# Patient Record
Sex: MALE | Race: BLACK OR AFRICAN AMERICAN | NOT HISPANIC OR LATINO | Employment: UNEMPLOYED | URBAN - METROPOLITAN AREA
[De-identification: names, ages, dates, MRNs, and addresses within clinical notes are randomized per-mention and may not be internally consistent; named-entity substitution may affect disease eponyms.]

---

## 2018-09-27 ENCOUNTER — OFFICE VISIT - HEALTHEAST (OUTPATIENT)
Dept: FAMILY MEDICINE | Facility: CLINIC | Age: 48
End: 2018-09-27

## 2018-09-27 ENCOUNTER — COMMUNICATION - HEALTHEAST (OUTPATIENT)
Dept: FAMILY MEDICINE | Facility: CLINIC | Age: 48
End: 2018-09-27

## 2018-09-27 DIAGNOSIS — Z23 IMMUNIZATION DUE: ICD-10-CM

## 2018-09-27 DIAGNOSIS — Z00.00 ROUTINE GENERAL MEDICAL EXAMINATION AT A HEALTH CARE FACILITY: ICD-10-CM

## 2018-09-27 DIAGNOSIS — M79.605 PAIN IN BOTH LOWER EXTREMITIES: ICD-10-CM

## 2018-09-27 DIAGNOSIS — M79.604 PAIN IN BOTH LOWER EXTREMITIES: ICD-10-CM

## 2018-09-27 DIAGNOSIS — R35.89 POLYURIA: ICD-10-CM

## 2018-09-27 LAB
ANION GAP SERPL CALCULATED.3IONS-SCNC: 10 MMOL/L (ref 5–18)
BUN SERPL-MCNC: 9 MG/DL (ref 8–22)
CALCIUM SERPL-MCNC: 9.7 MG/DL (ref 8.5–10.5)
CHLORIDE BLD-SCNC: 102 MMOL/L (ref 98–107)
CO2 SERPL-SCNC: 27 MMOL/L (ref 22–31)
CREAT SERPL-MCNC: 0.86 MG/DL (ref 0.7–1.3)
GFR SERPL CREATININE-BSD FRML MDRD: >60 ML/MIN/1.73M2
GLUCOSE BLD-MCNC: 219 MG/DL (ref 70–125)
LDLC SERPL CALC-MCNC: 100 MG/DL
POTASSIUM BLD-SCNC: 4.6 MMOL/L (ref 3.5–5)
SODIUM SERPL-SCNC: 139 MMOL/L (ref 136–145)

## 2018-09-27 ASSESSMENT — MIFFLIN-ST. JEOR: SCORE: 1727.93

## 2020-11-10 ENCOUNTER — COMMUNICATION - HEALTHEAST (OUTPATIENT)
Dept: TELEHEALTH | Facility: CLINIC | Age: 50
End: 2020-11-10

## 2020-11-10 ENCOUNTER — OFFICE VISIT - HEALTHEAST (OUTPATIENT)
Dept: FAMILY MEDICINE | Facility: CLINIC | Age: 50
End: 2020-11-10

## 2020-11-10 ENCOUNTER — HOSPITAL ENCOUNTER (OUTPATIENT)
Dept: ULTRASOUND IMAGING | Facility: CLINIC | Age: 50
Discharge: HOME | End: 2020-11-10
Attending: FAMILY MEDICINE

## 2020-11-10 DIAGNOSIS — M79.89 CALF SWELLING: ICD-10-CM

## 2020-11-10 DIAGNOSIS — Z13.220 LIPID SCREENING: ICD-10-CM

## 2020-11-10 DIAGNOSIS — Z11.59 ENCOUNTER FOR HCV SCREENING TEST FOR LOW RISK PATIENT: ICD-10-CM

## 2020-11-10 DIAGNOSIS — Z00.00 HEALTHCARE MAINTENANCE: ICD-10-CM

## 2020-11-10 DIAGNOSIS — I82.401 ACUTE DEEP VEIN THROMBOSIS (DVT) OF RIGHT LOWER EXTREMITY, UNSPECIFIED VEIN (H): ICD-10-CM

## 2020-11-10 DIAGNOSIS — Z11.4 SCREENING FOR HUMAN IMMUNODEFICIENCY VIRUS WITHOUT PRESENCE OF RISK FACTORS: ICD-10-CM

## 2020-11-10 LAB
ALBUMIN SERPL-MCNC: 4.2 G/DL (ref 3.5–5)
ALP SERPL-CCNC: 82 U/L (ref 45–120)
ALT SERPL W P-5'-P-CCNC: 23 U/L (ref 0–45)
ANION GAP SERPL CALCULATED.3IONS-SCNC: 9 MMOL/L (ref 5–18)
AST SERPL W P-5'-P-CCNC: 13 U/L (ref 0–40)
BASOPHILS # BLD AUTO: 0 THOU/UL (ref 0–0.2)
BASOPHILS NFR BLD AUTO: 1 % (ref 0–2)
BILIRUB SERPL-MCNC: 0.6 MG/DL (ref 0–1)
BUN SERPL-MCNC: 14 MG/DL (ref 8–22)
CALCIUM SERPL-MCNC: 9.7 MG/DL (ref 8.5–10.5)
CHLORIDE BLD-SCNC: 100 MMOL/L (ref 98–107)
CHOLEST SERPL-MCNC: 187 MG/DL
CO2 SERPL-SCNC: 28 MMOL/L (ref 22–31)
CREAT SERPL-MCNC: 0.9 MG/DL (ref 0.7–1.3)
EOSINOPHIL # BLD AUTO: 0.3 THOU/UL (ref 0–0.4)
EOSINOPHIL NFR BLD AUTO: 4 % (ref 0–6)
ERYTHROCYTE [DISTWIDTH] IN BLOOD BY AUTOMATED COUNT: 10.7 % (ref 11–14.5)
FASTING STATUS PATIENT QL REPORTED: NO
GFR SERPL CREATININE-BSD FRML MDRD: >60 ML/MIN/1.73M2
GLUCOSE BLD-MCNC: 306 MG/DL (ref 70–125)
HCT VFR BLD AUTO: 45.6 % (ref 40–54)
HDLC SERPL-MCNC: 43 MG/DL
HGB BLD-MCNC: 15.5 G/DL (ref 14–18)
HIV 1+2 AB+HIV1 P24 AG SERPL QL IA: NEGATIVE
INR PPP: 1.02 (ref 0.9–1.1)
LDLC SERPL CALC-MCNC: 109 MG/DL
LYMPHOCYTES # BLD AUTO: 1.8 THOU/UL (ref 0.8–4.4)
LYMPHOCYTES NFR BLD AUTO: 23 % (ref 20–40)
MCH RBC QN AUTO: 31.1 PG (ref 27–34)
MCHC RBC AUTO-ENTMCNC: 33.9 G/DL (ref 32–36)
MCV RBC AUTO: 92 FL (ref 80–100)
MONOCYTES # BLD AUTO: 0.6 THOU/UL (ref 0–0.9)
MONOCYTES NFR BLD AUTO: 7 % (ref 2–10)
NEUTROPHILS # BLD AUTO: 5.1 THOU/UL (ref 2–7.7)
NEUTROPHILS NFR BLD AUTO: 66 % (ref 50–70)
PLATELET # BLD AUTO: 176 THOU/UL (ref 140–440)
PMV BLD AUTO: 8.7 FL (ref 7–10)
POTASSIUM BLD-SCNC: 4 MMOL/L (ref 3.5–5)
PROT SERPL-MCNC: 7.4 G/DL (ref 6–8)
RBC # BLD AUTO: 4.98 MILL/UL (ref 4.4–6.2)
SODIUM SERPL-SCNC: 137 MMOL/L (ref 136–145)
TRIGL SERPL-MCNC: 176 MG/DL
WBC: 7.8 THOU/UL (ref 4–11)

## 2020-11-10 ASSESSMENT — MIFFLIN-ST. JEOR: SCORE: 1682.72

## 2020-11-11 ENCOUNTER — COMMUNICATION - HEALTHEAST (OUTPATIENT)
Dept: FAMILY MEDICINE | Facility: CLINIC | Age: 50
End: 2020-11-11

## 2020-11-11 DIAGNOSIS — I82.401 ACUTE DEEP VEIN THROMBOSIS (DVT) OF RIGHT LOWER EXTREMITY, UNSPECIFIED VEIN (H): ICD-10-CM

## 2020-11-11 LAB
25(OH)D3 SERPL-MCNC: 19.4 NG/ML (ref 30–80)
25(OH)D3 SERPL-MCNC: 19.4 NG/ML (ref 30–80)
HCV AB SERPL QL IA: NEGATIVE

## 2020-11-16 ENCOUNTER — OFFICE VISIT - HEALTHEAST (OUTPATIENT)
Dept: FAMILY MEDICINE | Facility: CLINIC | Age: 50
End: 2020-11-16

## 2020-11-16 DIAGNOSIS — R73.9 HYPERGLYCEMIA: ICD-10-CM

## 2020-11-16 DIAGNOSIS — I82.401 ACUTE DEEP VEIN THROMBOSIS (DVT) OF RIGHT LOWER EXTREMITY, UNSPECIFIED VEIN (H): ICD-10-CM

## 2020-11-16 LAB — HBA1C MFR BLD: 8.4 %

## 2020-11-16 RX ORDER — RIVAROXABAN 15 MG/1
1 TABLET, FILM COATED ORAL 2 TIMES DAILY
Status: SHIPPED | COMMUNITY
Start: 2020-11-11 | End: 2024-09-09

## 2020-11-17 ENCOUNTER — COMMUNICATION - HEALTHEAST (OUTPATIENT)
Dept: FAMILY MEDICINE | Facility: CLINIC | Age: 50
End: 2020-11-17

## 2020-11-18 ENCOUNTER — AMBULATORY - HEALTHEAST (OUTPATIENT)
Dept: FAMILY MEDICINE | Facility: CLINIC | Age: 50
End: 2020-11-18

## 2020-11-18 DIAGNOSIS — E11.65 TYPE 2 DIABETES MELLITUS WITH HYPERGLYCEMIA, WITHOUT LONG-TERM CURRENT USE OF INSULIN (H): ICD-10-CM

## 2020-11-18 RX ORDER — METFORMIN HCL 500 MG
500 TABLET, EXTENDED RELEASE 24 HR ORAL
Qty: 30 TABLET | Refills: 3 | Status: SHIPPED | OUTPATIENT
Start: 2020-11-18 | End: 2024-09-05

## 2020-12-02 ENCOUNTER — COMMUNICATION - HEALTHEAST (OUTPATIENT)
Dept: FAMILY MEDICINE | Facility: CLINIC | Age: 50
End: 2020-12-02

## 2020-12-02 DIAGNOSIS — E11.65 TYPE 2 DIABETES MELLITUS WITH HYPERGLYCEMIA, WITHOUT LONG-TERM CURRENT USE OF INSULIN (H): ICD-10-CM

## 2020-12-02 RX ORDER — GLUCOSAMINE HCL/CHONDROITIN SU 500-400 MG
CAPSULE ORAL
Qty: 100 STRIP | Refills: 3 | Status: SHIPPED | OUTPATIENT
Start: 2020-12-02 | End: 2024-09-15

## 2020-12-04 ENCOUNTER — COMMUNICATION - HEALTHEAST (OUTPATIENT)
Dept: ADMINISTRATIVE | Facility: CLINIC | Age: 50
End: 2020-12-04

## 2021-01-06 ENCOUNTER — COMMUNICATION - HEALTHEAST (OUTPATIENT)
Dept: TELEHEALTH | Facility: CLINIC | Age: 51
End: 2021-01-06

## 2021-01-06 ENCOUNTER — OFFICE VISIT - HEALTHEAST (OUTPATIENT)
Dept: FAMILY MEDICINE | Facility: CLINIC | Age: 51
End: 2021-01-06

## 2021-01-06 DIAGNOSIS — I82.401 ACUTE DEEP VEIN THROMBOSIS (DVT) OF RIGHT LOWER EXTREMITY, UNSPECIFIED VEIN (H): ICD-10-CM

## 2021-01-06 DIAGNOSIS — E11.65 TYPE 2 DIABETES MELLITUS WITH HYPERGLYCEMIA, WITHOUT LONG-TERM CURRENT USE OF INSULIN (H): ICD-10-CM

## 2021-01-06 LAB
FASTING STATUS PATIENT QL REPORTED: NO
GLUCOSE BLD-MCNC: 184 MG/DL (ref 74–125)
HBA1C MFR BLD: 8.3 %

## 2021-01-07 ENCOUNTER — COMMUNICATION - HEALTHEAST (OUTPATIENT)
Dept: FAMILY MEDICINE | Facility: CLINIC | Age: 51
End: 2021-01-07

## 2021-02-22 ENCOUNTER — COMMUNICATION - HEALTHEAST (OUTPATIENT)
Dept: FAMILY MEDICINE | Facility: CLINIC | Age: 51
End: 2021-02-22

## 2021-02-22 DIAGNOSIS — I82.401 ACUTE DEEP VEIN THROMBOSIS (DVT) OF RIGHT LOWER EXTREMITY, UNSPECIFIED VEIN (H): ICD-10-CM

## 2021-02-22 RX ORDER — RIVAROXABAN 20 MG/1
20 TABLET, FILM COATED ORAL ONCE
Qty: 30 TABLET | Refills: 5 | Status: SHIPPED | OUTPATIENT
Start: 2021-02-22 | End: 2021-02-22

## 2021-05-01 ENCOUNTER — HEALTH MAINTENANCE LETTER (OUTPATIENT)
Age: 51
End: 2021-05-01

## 2021-05-21 ENCOUNTER — COMMUNICATION - HEALTHEAST (OUTPATIENT)
Dept: FAMILY MEDICINE | Facility: CLINIC | Age: 51
End: 2021-05-21

## 2021-05-21 DIAGNOSIS — E11.65 TYPE 2 DIABETES MELLITUS WITH HYPERGLYCEMIA, WITHOUT LONG-TERM CURRENT USE OF INSULIN (H): ICD-10-CM

## 2021-06-02 VITALS — WEIGHT: 189 LBS | HEIGHT: 70 IN | BODY MASS INDEX: 27.06 KG/M2

## 2021-06-05 VITALS
TEMPERATURE: 97 F | DIASTOLIC BLOOD PRESSURE: 92 MMHG | HEART RATE: 84 BPM | WEIGHT: 178 LBS | BODY MASS INDEX: 25.54 KG/M2 | RESPIRATION RATE: 20 BRPM | SYSTOLIC BLOOD PRESSURE: 145 MMHG

## 2021-06-05 VITALS
TEMPERATURE: 98.1 F | DIASTOLIC BLOOD PRESSURE: 82 MMHG | SYSTOLIC BLOOD PRESSURE: 152 MMHG | WEIGHT: 180 LBS | HEIGHT: 70 IN | RESPIRATION RATE: 20 BRPM | BODY MASS INDEX: 25.77 KG/M2 | HEART RATE: 88 BPM

## 2021-06-05 VITALS
DIASTOLIC BLOOD PRESSURE: 84 MMHG | SYSTOLIC BLOOD PRESSURE: 137 MMHG | RESPIRATION RATE: 20 BRPM | BODY MASS INDEX: 26.26 KG/M2 | TEMPERATURE: 97.6 F | HEART RATE: 85 BPM | WEIGHT: 183 LBS

## 2021-06-13 NOTE — PROGRESS NOTES
"ASSESSMENT/PLAN:  1. Acute deep vein thrombosis (DVT) of right lower extremity, unspecified vein (H)  rivaroxaban ANTICOAGULANT (XARELTO) 20 mg tablet   2. Hyperglycemia  Glycosylated Hemoglobin A1c       This is a 51 yo male with:  1.  Acute DVT right lower extremity - currently on Rivaroxaban.  Tolerating well.  Reviewed the dosing schedule.    2.  Hyperglycemia - has elevated A1c - fits criteria for type II diabetes.  Patient is quite certain that his numbers were high in the past, now claims they are better and he is certain that he does not have diabetes.  Again, we reviewed the A1c and its implications.  I really think he needs medications - he declines.  I have discussed the consequences of ignoring this.  I would like to see him back on short term followup.               No follow-ups on file.      Medications Discontinued During This Encounter   Medication Reason     apixaban ANTICOAGULANT (ELIQUIS) 5 mg Tab tablet      There are no Patient Instructions on file for this visit.    Chief Complaint:  Chief Complaint   Patient presents with     Follow-up     right leg blood clot       HPI:   Domenico Harper is a 50 y.o. male c/o  Leg still occasionally painful  No chest pain, no shortness of breath    Long time ago - 1990 - had small surgery - had \"branch\" in his skin - on right lower lateral leg      PMH:   Patient Active Problem List    Diagnosis Date Noted     Type 2 diabetes mellitus with hyperglycemia, without long-term current use of insulin (H) 11/18/2020     No past medical history on file.  No past surgical history on file.  Social History     Socioeconomic History     Marital status:      Spouse name: Not on file     Number of children: Not on file     Years of education: Not on file     Highest education level: Not on file   Occupational History     Not on file   Social Needs     Financial resource strain: Not on file     Food insecurity     Worry: Not on file     Inability: Not on file     " Transportation needs     Medical: Not on file     Non-medical: Not on file   Tobacco Use     Smoking status: Never Smoker     Smokeless tobacco: Never Used     Tobacco comment: no tobacco exposure   Substance and Sexual Activity     Alcohol use: Not on file     Drug use: Not on file     Sexual activity: Yes     Partners: Female     Birth control/protection: Condom   Lifestyle     Physical activity     Days per week: Not on file     Minutes per session: Not on file     Stress: Not on file   Relationships     Social connections     Talks on phone: Not on file     Gets together: Not on file     Attends [employer] service: Not on file     Active member of club or organization: Not on file     Attends meetings of clubs or organizations: Not on file     Relationship status: Not on file     Intimate partner violence     Fear of current or ex partner: Not on file     Emotionally abused: Not on file     Physically abused: Not on file     Forced sexual activity: Not on file   Other Topics Concern     Not on file   Social History Narrative     Not on file     No family history on file.    Meds:    Current Outpatient Medications:      metFORMIN (GLUCOPHAGE XR) 500 MG 24 hr tablet, Take 1 tablet (500 mg total) by mouth daily with breakfast., Disp: 30 tablet, Rfl: 3     [START ON 12/9/2020] rivaroxaban ANTICOAGULANT (XARELTO) 20 mg tablet, Take 1 tablet (20 mg total) by mouth daily., Disp: 90 tablet, Rfl: 1     XARELTO 15 mg tablet, Take 1 tablet by mouth 2 (two) times a day. X 21 days (starting 11/11/2020), Disp: , Rfl:      XARELTO 20 mg tablet, Take 1 tablet by mouth daily. Starting 12/2/2020, Disp: , Rfl:     Allergies:  No Known Allergies    ROS:  Pertinent positives as noted in HPI; otherwise 12 point ROS negative.      Physical Exam:  EXAM:  BP (!) 145/92   Pulse 84   Temp 97  F (36.1  C) (Tympanic)   Resp 20   Wt 178 lb (80.7 kg)   BMI 25.54 kg/m     Gen:  NAD, appears well, well-hydrated  HEENT:  TMs nl, oropharynx  benign, nasal mucosa nl, conjunctiva clear  Neck:  Supple, no adenopathy, no thyromegaly, no carotid bruits, no JVD  Lungs:  Clear to auscultation bilaterally  Cor:  RRR no murmur  Abd:  Soft, nontender, BS+, no masses, no guarding or rebound, no HSM  Extr:  Less swelling in right lower extremity, no tenderness in claf  Neuro:  No asymmetry  Skin:  Warm/dry        Results:  Results for orders placed or performed in visit on 11/16/20   Glycosylated Hemoglobin A1c   Result Value Ref Range    Hemoglobin A1c 8.4 (H) <=5.6 %

## 2021-06-13 NOTE — TELEPHONE ENCOUNTER
----- Message from Miri Kohler, Diabetes Ed sent at 12/3/2020  4:13 PM CST -----  Regarding: appointment  Would you please reach out to patient to schedule an appointment .    Thank you,    Miri GOLDMAN

## 2021-06-13 NOTE — TELEPHONE ENCOUNTER
Drug Change Request  Who is calling?:  Patient  What is the current medication?:    Disp Refills Start End MARIO    rivaroxaban ANTICOAGULANT (XARELTO) 15 mg (42)- 20 mg (9) dosepak 51 tablet 0 11/10/2020  --   Sig: Take 1 tablet (15mg) po bid for 21 days then take 1 tablet (20mg) daily   Sent to pharmacy as: rivaroxaban 15 mg (42)-20 mg (9) tablets in a starter pack (XARELTO)   E-Prescribing Status: Receipt confirmed by pharmacy (11/10/2020  6:49 PM CST)       What alternative is being requested?: n/a  Why the request to change?:  Patient stated he is not able to get the Rx above because it's not covered by his insurance and the pharmacy does not have it in stock. Patient would like a different Rx sent in that is covered and is available.  [facility]ed [facility]acy?: [facility]  Okay to leave a detailed message?:  No

## 2021-06-13 NOTE — TELEPHONE ENCOUNTER
Medication Request  Medication name: Glucometer and test strips  Requested Pharmacy: [facility]  Reason for request: Abnormal A1C (8.4)  When did you use medication last?:  Has not used yet  Patient offered appointment:  patient declined  Okay to leave a detailed message: yes

## 2021-06-13 NOTE — PROGRESS NOTES
ASSESSMENT/PLAN:  1. Calf swelling  US Venous Leg Right    INR   2. Lipid screening  Lipid [place] FASTING   3. Encounter for HCV screening test for low risk patient  Hepatitis C Antibody (Anti-HCV)   4. Screening for human immunodeficiency virus without presence of risk factors  HIV Antigen/Antibody Screening [place]   5. Healthcare maintenance  Comprehensive Metabolic Panel    HM1(CBC and Differential)    Vitamin D, Total (25-Hydroxy)   6. Acute deep vein thrombosis (DVT) of right lower extremity, unspecified vein (H)  DISCONTINUED: rivaroxaban ANTICOAGULANT (XARELTO) 15 mg (42)- 20 mg (9) dosepak       This is a 49 yo male  ([place] the road [place] [facility]) who presents with:  1.  Painful right calf swelling - no h/o thrombosis (no personal nor family history).  Has had persistent swelling in right lower extremity - mostly in proximal calf, but also distal posterior thigh.  Does not have knee pain.  Pulses intact.  I am very suspicious for DVT - will send for venous ultrasound ASAP.      2.  Acute DVT - right lower extremity - patient has acute DVT by ultrasound today - discussed with radiology, then with patient.  Discussed warfarin vs. DOAC.  Will start Xarelto.  Rec heck next week.  Discussed that he should not drive this week.   Phone call from [facility]:  Patient has acute blood clot - right lower extremity.    3.  Health Maintenance -   discussed recommendation for hepatitis C and HIV screening - ordered  Check screening labs    Return in about 1 week (around 11/17/2020) for Recheck DVT.      There are no discontinued medications.  There are no Patient Instructions on file for this visit.    Chief Complaint:  Chief Complaint   Patient presents with     Leg Swelling     right leg       HPI:   Domenico PERAZA Meredith is a 50 y.o. male c/o  No injury   [place]   Never had blood clot -   Swelling in right lower extremity - started last week  Painful especially  just below posterior knee        PMH:   There are no active problems to display for this patient.    History reviewed. No pertinent past medical history.  History reviewed. No pertinent surgical history.  Social History     Socioeconomic History     Marital status:      Spouse name: Not on file     Number of children: Not on file     Years of education: Not on file     Highest education level: Not on file   Occupational History     Not on file   Social Needs     Financial resource strain: Not on file     Food insecurity     Worry: Not on file     Inability: Not on file     Transportation needs     Medical: Not on file     Non-medical: Not on file   Tobacco Use     Smoking status: Never Smoker     Smokeless tobacco: Never Used     Tobacco comment: no tobacco exposure   Substance and Sexual Activity     Alcohol use: Not on file     Drug use: Not on file     Sexual activity: Yes     Partners: Female     Birth control/protection: Condom   Lifestyle     Physical activity     Days per week: Not on file     Minutes per session: Not on file     Stress: Not on file   Relationships     Social connections     Talks on phone: Not on file     Gets together: Not on file     Attends Jehovah's witness service: Not on file     Active member of club or organization: Not on file     Attends meetings of clubs or organizations: Not on file     Relationship status: Not on file     Intimate partner violence     Fear of current or ex partner: Not on file     Emotionally abused: Not on file     Physically abused: Not on file     Forced sexual activity: Not on file   Other Topics Concern     Not on file   Social History Narrative     Not on file     History reviewed. No pertinent family history.    Meds:    Current Outpatient Medications:      apixaban ANTICOAGULANT (ELIQUIS) 5 mg Tab tablet, Take 2 tablets (10 mg total) by mouth 2 (two) times a day for 7 days, THEN 1 tablet (5 mg total) 2 (two) times a day., Disp: 74 tablet, Rfl:  "1    Allergies:  No Known Allergies    ROS:  Pertinent positives as noted in HPI; otherwise 12 point ROS negative.      Physical Exam:  EXAM:  /82   Pulse 88   Temp 98.1  F (36.7  C) (Tympanic)   Resp 20   Ht 5' 10\" (1.778 m)   Wt 180 lb (81.6 kg)   BMI 25.83 kg/m     Gen:  NAD, appears well, well-hydrated  HEENT:  TMs nl, oropharynx benign, nasal mucosa nl, conjunctiva clear  Neck:  Supple, no adenopathy, no thyromegaly, no carotid bruits, no JVD  Lungs:  Clear to auscultation bilaterally  Cor:  RRR no murmur  Abd:  Soft, nontender, BS+, no masses, no guarding or rebound, no HSM  Extr:  Swelling and tenderness in proximal right calf - positive squeeze  Neuro:  No asymmetry  Skin:  Warm/dry        Results:  Results for orders placed or performed in visit on 11/10/20   Comprehensive Metabolic Panel   Result Value Ref Range    Sodium 137 136 - 145 mmol/L    Potassium 4.0 3.5 - 5.0 mmol/L    Chloride 100 98 - 107 mmol/L    CO2 28 22 - 31 mmol/L    Anion Gap, Calculation 9 5 - 18 mmol/L    Glucose 306 (H) 70 - 125 mg/dL    BUN 14 8 - 22 mg/dL    Creatinine 0.90 0.70 - 1.30 mg/dL    GFR MDRD Af Amer >60 >60 mL/min/1.73m2    GFR MDRD Non Af Amer >60 >60 mL/min/1.73m2    Bilirubin, Total 0.6 0.0 - 1.0 mg/dL    Calcium 9.7 8.5 - 10.5 mg/dL    Protein, Total 7.4 6.0 - 8.0 g/dL    Albumin 4.2 3.5 - 5.0 g/dL    Alkaline Phosphatase 82 45 - 120 U/L    AST 13 0 - 40 U/L    ALT 23 0 - 45 U/L   Lipid [place] FASTING   Result Value Ref Range    Cholesterol 187 <=199 mg/dL    Triglycerides 176 (H) <=149 mg/dL    HDL Cholesterol 43 >=40 mg/dL    LDL Calculated 109 <=129 mg/dL    Patient Fasting > 8hrs? No    Hepatitis C Antibody (Anti-HCV)   Result Value Ref Range    Hepatitis C Ab Negative Negative   HIV Antigen/Antibody Screening [place]   Result Value Ref Range    HIV Antigen / Antibody Negative Negative   Vitamin D, Total (25-Hydroxy)   Result Value Ref Range    Vitamin D, Total (25-Hydroxy) 19.4 (L) 30.0 - 80.0 ng/mL "   INR   Result Value Ref Range    INR 1.02 0.90 - 1.10   HM1 (CBC with Diff)   Result Value Ref Range    WBC 7.8 4.0 - 11.0 thou/uL    RBC 4.98 4.40 - 6.20 mill/uL    Hemoglobin 15.5 14.0 - 18.0 g/dL    Hematocrit 45.6 40.0 - 54.0 %    MCV 92 80 - 100 fL    MCH 31.1 27.0 - 34.0 pg    MCHC 33.9 32.0 - 36.0 g/dL    RDW 10.7 (L) 11.0 - 14.5 %    Platelets 176 140 - 440 thou/uL    MPV 8.7 7.0 - 10.0 fL    Neutrophils % 66 50 - 70 %    Lymphocytes % 23 20 - 40 %    Monocytes % 7 2 - 10 %    Eosinophils % 4 0 - 6 %    Basophils % 1 0 - 2 %    Neutrophils Absolute 5.1 2.0 - 7.7 thou/uL    Lymphocytes Absolute 1.8 0.8 - 4.4 thou/uL    Monocytes Absolute 0.6 0.0 - 0.9 thou/uL    Eosinophils Absolute 0.3 0.0 - 0.4 thou/uL    Basophils Absolute 0.0 0.0 - 0.2 thou/uL         Us Venous Leg Right    Result Date: 11/10/2020  EXAM: US VENOUS LEG RIGHT LOCATION: [facility] DATE/TIME: 11/10/2020 4:55 PM INDICATION: Right swollen calf; risk factor: over the road . COMPARISON: None. TECHNIQUE: Venous Duplex ultrasound of the right lower extremity with and without compression, augmentation and duplex. Color flow and spectral Doppler with waveform analysis performed. FINDINGS: Exam includes the common femoral, femoral, popliteal, and contralateral common femoral veins as well as segmentally visualized deep calf veins and greater saphenous vein. RIGHT: There is occlusive and nonocclusive DVT extending from peroneal and tibial veins in the calf through the popliteal vein extending into distal femoral vein. No superficial thrombophlebitis. No popliteal cyst.     1.  DVT as noted above. Results called to Dr. Hutton at 5:15 PM and discussed with her at 6:07 PM.

## 2021-06-13 NOTE — TELEPHONE ENCOUNTER
I am happy to have him check his blood sugars.  I'd also like him to see our diabetes educator, but he didn't want to do that.  Maybe you can let him know that he should see her as well.

## 2021-06-13 NOTE — TELEPHONE ENCOUNTER
Date: 12/17/2020 Status: [facility]   Time: 3:00 PM Length: 60   Visit Type: NUTRITION EVALUATION [[id number]] Copay: $0.00   Provider: Miri Kohler Diabetes Ed

## 2021-06-14 NOTE — PROGRESS NOTES
"ASSESSMENT/PLAN:  1. Acute deep vein thrombosis (DVT) of right lower extremity, unspecified vein (H)     2. Type 2 diabetes mellitus with hyperglycemia, without long-term current use of insulin (H)  Glycosylated Hemoglobin A1c    Glucose       This is a 49 yo male here for follow up:  1.  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  He thinks that his new job will be better - able to make more stops  2.  Type II DM - patient with type II DM - doesn't believe he needs to take medication for diabetes - tells me he had been told this before and it \"got better\" - we will recheck labs today and determine need for treatment.      Return in about 3 months (around 4/6/2021) for Diabetes.      There are no discontinued medications.  There are no Patient Instructions on file for this visit.    Chief Complaint:  Chief Complaint   Patient presents with     Follow-up     blood clot on leg       HPI:   Domenico Harper is a 50 y.o. male c/o  Has blood clot in leg - right lower extremity     Plans to go back to work end of month -   Will go back to different company    Sugars - not taking his medicine -   Was taking blood thinner -   Cut out the sugar in his diet         PMH:   Patient Active Problem List    Diagnosis Date Noted     Acute deep vein thrombosis (DVT) of right lower extremity, unspecified vein (H) 01/06/2021     Type 2 diabetes mellitus with hyperglycemia, without long-term current use of insulin (H) 11/18/2020     History reviewed. No pertinent past medical history.  History reviewed. No pertinent surgical history.  Social History     Socioeconomic History     Marital status:      Spouse name: Not on file     Number of children: " Not on file     Years of education: Not on file     Highest education level: Not on file   Occupational History     Not on file   Social Needs     Financial resource strain: Not on file     Food insecurity     Worry: Not on file     Inability: Not on file     Transportation needs     Medical: Not on file     Non-medical: Not on file   Tobacco Use     Smoking status: Never Smoker     Smokeless tobacco: Never Used     Tobacco comment: no tobacco exposure   Substance and Sexual Activity     Alcohol use: Not on file     Drug use: Not on file     Sexual activity: Yes     Partners: Female     Birth control/protection: Condom   Lifestyle     Physical activity     Days per week: Not on file     Minutes per session: Not on file     Stress: Not on file   Relationships     Social connections     Talks on phone: Not on file     Gets together: Not on file     Attends Hoahaoism service: Not on file     Active member of club or organization: Not on file     Attends meetings of clubs or organizations: Not on file     Relationship status: Not on file     Intimate partner violence     Fear of current or ex partner: Not on file     Emotionally abused: Not on file     Physically abused: Not on file     Forced sexual activity: Not on file   Other Topics Concern     Not on file   Social History Narrative     Not on file     History reviewed. No pertinent family history.    Meds:    Current Outpatient Medications:      blood glucose test strips, Use for home blood sugar monitoring two times a day.  Dispense brand per patient's insurance at pharmacy discretion., Disp: 100 strip, Rfl: 3     blood-glucose meter Misc, Use for home blood sugar monitoring two times a day.  Dispense brand per patient's insurance at pharmacy discretion., Disp: 1 each, Rfl: 0     generic lancets (FINGERSTIX LANCETS), Use for home blood sugar monitoring two times a day. Dispense brand per patient's insurance at pharmacy discretion., Disp: 100 each, Rfl: 3      rivaroxaban ANTICOAGULANT (XARELTO) 20 mg tablet, Take 1 tablet (20 mg total) by mouth daily., Disp: 90 tablet, Rfl: 1     XARELTO 15 mg tablet, Take 1 tablet by mouth 2 (two) times a day. X 21 days (starting 11/11/2020), Disp: , Rfl:      XARELTO 20 mg tablet, Take 1 tablet by mouth daily. Starting 12/2/2020, Disp: , Rfl:      metFORMIN (GLUCOPHAGE XR) 500 MG 24 hr tablet, Take 1 tablet (500 mg total) by mouth daily with breakfast., Disp: 30 tablet, Rfl: 3    Allergies:  No Known Allergies    ROS:  Pertinent positives as noted in HPI; otherwise 12 point ROS negative.      Physical Exam:  EXAM:  /84   Pulse 85   Temp 97.6  F (36.4  C) (Temporal)   Resp 20   Wt 183 lb (83 kg)   BMI 26.26 kg/m     Gen:  NAD, appears well, well-hydrated  HEENT:  TMs nl, oropharynx benign, nasal mucosa nl, conjunctiva clear  Neck:  Supple, no adenopathy, no thyromegaly, no carotid bruits, no JVD  Lungs:  Clear to auscultation bilaterally  Cor:  RRR no murmur  Abd:  Soft, nontender, BS+, no masses, no guarding or rebound, no HSM  Extr:  Minimal swelling in right lower extremity;   Neuro:  No asymmetry, Nl motor tone/strength, nl sensation, reflexes =, gait nl, nl coordination, CN intact,   Skin:  Warm/dry        Results:  Results for orders placed or performed in visit on 01/06/21   Glycosylated Hemoglobin A1c   Result Value Ref Range    Hemoglobin A1c 8.3 (H) <=5.6 %   Glucose   Result Value Ref Range    Glucose 184 (H) 74 - 125 mg/dL    Patient Fasting > 8hrs? No

## 2021-06-15 NOTE — TELEPHONE ENCOUNTER
RN cannot approve Refill Request    RN can NOT refill this medication med is not covered by policy/route to provider. Last office visit: 1/6/2021 Gloria Rogers MD Last Physical: Visit date not found Last MTM visit: Visit date not found Last visit same specialty: 1/6/2021 Gloria Rogers MD.  Next visit within 3 mo: Visit date not found  Next physical within 3 mo: Visit date not found      Erin Lindsay, [facility] Triage/Med Refill 2/22/2021    Requested Prescriptions   Pending Prescriptions Disp Refills     XARELTO 20 mg tablet  0     Sig: Take 1 tablet (20 mg total) by mouth daily. Starting 12/2/2020       Apixaban/Rivaroxaban/Dabigatran Refill Protocol Failed - 2/22/2021  9:58 AM        Failed - Route to appropriate pool/provider     Last Anticoagulation Summary:           Passed - Renal function test in last year     Creatinine   Date Value Ref Range Status   11/10/2020 0.90 0.70 - 1.30 mg/dL Final             Passed - PCP or prescribing provider visit in past 12 months       Last office visit with prescriber/PCP: 1/6/2021 Gloria Rogers MD OR same dept: 1/6/2021 Gloria Rogers MD OR same specialty: 1/6/2021 Gloria Rogers MD  Last physical: Visit date not found Last MTM visit: Visit date not found   Next visit within 3 mo: Visit date not found  Next physical within 3 mo: Visit date not found  Prescriber OR PCP: Gloria Rogers MD  Last diagnosis associated with med order: There are no diagnoses linked to this encounter.  If protocol passes may refill for 12 months if within 3 months of last provider visit (or a total of 15 months).

## 2021-06-16 PROBLEM — E11.65 TYPE 2 DIABETES MELLITUS WITH HYPERGLYCEMIA, WITHOUT LONG-TERM CURRENT USE OF INSULIN (H): Status: ACTIVE | Noted: 2020-11-18

## 2021-06-16 PROBLEM — I82.401 ACUTE DEEP VEIN THROMBOSIS (DVT) OF RIGHT LOWER EXTREMITY, UNSPECIFIED VEIN (H): Status: ACTIVE | Noted: 2021-01-06

## 2021-06-17 NOTE — TELEPHONE ENCOUNTER
Reason for Call:  Other      Detailed comments:  Patients wife is calling. His diabetic monitor machine isnt working. She called the pharmacy and they said they need a referral from his dr to get a new one.    Phone Number Patient can be reached at: Other phone number:  [phone]    Best Time: asap    Can we leave a detailed message on this number?: Yes    Call taken on 5/21/2021 at 3:53 PM by Yenifer Chaudhary

## 2021-06-20 NOTE — PROGRESS NOTES
New pt  occ tightness in legs with sitting  Wishes to check sugar.  Will wake from sleep after 3 hours.   Up for an hour then back to sleep.  Not worrying.   Not a concern    hosp surg neg  fmh neg  Med neg  nkda  Hab neg cig    No etoh  Fhsh:  five kids   Oldest is 9   Youngest is 3 in November   Manufacturing   Sits.  .  Enjoys.    A lot of upper ext movement.   Two years.  occ to gym     ROS:  Constitutional: denies fever  Vision: denies change in vision  ENT: denies cough or congestion  Thyroid: denies unusual fatigue  Resp: denies shortness of breath  Card: denies palpitations  GI: denies vomiting or abnormal stool, melena, hematochezia  : denies dysuria  Neuro: denies numbness or weakness  Derm: denies rash  Joints: denies redness or swelling  Endo: denies polyuria  Mental health: mood is good  Extremities: no edema  Mobility: stable    Otherwise negative review of systems         OBJECTIVE:   Vitals:    09/27/18 0936   BP: 138/90   Pulse: 84   Resp: 22   Temp: 97  F (36.1  C)      Wt is noted.  No diaphoresis  Eyes: nl eom, anicteric   External ears, nose: nl    Neck: nl nodes, supple, thyroid normal   Lungs: clear to ausc   Heart: regular rhythm  Abd: soft nontender     No cva (renal) tenderness  Neuro: no weakness  Skin no rash  Joints: uninflamed   No ketotic breath odor noted  Mental: euthymic  Ext: nontender calves   Gait: normal    Body mass index is 26.91 kg/(m^2).       ASSESSMENT/PLAN:   Health Maintenance/ Plan: anticipatory guidance, discussion of risk factors, lifestyle modification, and risk factor management. For children age 6 months to five years verbal dental referral is given and discussed benefits and risks of FVA.       Additional diagnoses and related orders:  1. Routine general medical examination at a health care facility  Basic Metabolic Panel    LDL Cholesterol, Direct   2. Immunization due  Tdap vaccine greater than or equal to 8yo IM   3. Polyuria  Basic  Metabolic Panel   4. Pain in both lower extremities  Basic Metabolic Panel

## 2021-06-21 NOTE — LETTER
Letter by Gloria Rogers MD at      Author: Gloria Rogers MD Service: -- Author Type: --    Filed:  Encounter Date: 1/7/2021 Status: (Other)         Domenico Harper  [address] [place] [address]             January 7, 2021         Dear Mr. Harper,    Below are the results from your recent visit:    Resulted Orders   Glycosylated Hemoglobin A1c   Result Value Ref Range    Hemoglobin A1c 8.3 (H) <=5.6 %   Glucose   Result Value Ref Range    Glucose 184 (H) 74 - 125 mg/dL    Patient Fasting > 8hrs? No     Narrative    Fasting Glucose reference range is 70-99 mg/dL per  American Diabetes Association (ADA) guidelines.       Your blood sugars are still too high.  I think you should take your medication.  When sugars are too high, it is not good for your kidneys, or blood vessels, or your vision.  We need to control the sugars.      Please call with questions or contact us using [employer].    Sincerely,        Electronically signed by Gloria Rogers MD

## 2021-06-21 NOTE — LETTER
Letter by Gloria Rogers MD at      Author: Gloria Rogers MD Service: -- Author Type: --    Filed:  Encounter Date: 1/6/2021 Status: (Other)         January 6, 2021     Patient: Domenico Harper   YOB: 1970   Date of Visit: 1/6/2021       To Whom it May Concern:    Domenico Harper was seen in my clinic on 1/6/2021.    If you have any questions or concerns, please don't hesitate to call.    Sincerely,         Electronically signed by Gloria Rogers MD

## 2021-08-21 ENCOUNTER — HEALTH MAINTENANCE LETTER (OUTPATIENT)
Age: 51
End: 2021-08-21

## 2021-10-11 ENCOUNTER — HEALTH MAINTENANCE LETTER (OUTPATIENT)
Age: 51
End: 2021-10-11

## 2021-10-16 ENCOUNTER — HEALTH MAINTENANCE LETTER (OUTPATIENT)
Age: 51
End: 2021-10-16

## 2021-12-11 ENCOUNTER — HEALTH MAINTENANCE LETTER (OUTPATIENT)
Age: 51
End: 2021-12-11

## 2022-04-02 ENCOUNTER — HEALTH MAINTENANCE LETTER (OUTPATIENT)
Age: 52
End: 2022-04-02

## 2022-05-03 ENCOUNTER — APPOINTMENT (OUTPATIENT)
Dept: RADIOLOGY | Facility: HOSPITAL | Age: 52
End: 2022-05-03
Attending: EMERGENCY MEDICINE
Payer: COMMERCIAL

## 2022-05-03 ENCOUNTER — HOSPITAL ENCOUNTER (EMERGENCY)
Facility: HOSPITAL | Age: 52
Discharge: HOME | End: 2022-05-03
Attending: EMERGENCY MEDICINE | Admitting: EMERGENCY MEDICINE
Payer: COMMERCIAL

## 2022-05-03 VITALS
TEMPERATURE: 98.9 F | HEART RATE: 89 BPM | OXYGEN SATURATION: 100 % | BODY MASS INDEX: 25.2 KG/M2 | WEIGHT: 180 LBS | DIASTOLIC BLOOD PRESSURE: 93 MMHG | RESPIRATION RATE: 18 BRPM | HEIGHT: 71 IN | SYSTOLIC BLOOD PRESSURE: 179 MMHG

## 2022-05-03 DIAGNOSIS — M25.512 LEFT SHOULDER PAIN, UNSPECIFIED CHRONICITY: ICD-10-CM

## 2022-05-03 DIAGNOSIS — M19.012 OSTEOARTHRITIS OF LEFT SHOULDER, UNSPECIFIED OSTEOARTHRITIS TYPE: ICD-10-CM

## 2022-05-03 PROCEDURE — 99283 EMERGENCY DEPT VISIT LOW MDM: CPT

## 2022-05-03 PROCEDURE — 73030 X-RAY EXAM OF SHOULDER: CPT | Mod: LT

## 2022-05-03 RX ORDER — NAPROXEN 500 MG/1
500 TABLET ORAL 2 TIMES DAILY WITH MEALS
Qty: 16 TABLET | Refills: 0 | Status: SHIPPED | OUTPATIENT
Start: 2022-05-03 | End: 2022-05-11

## 2022-05-03 RX ORDER — LIDOCAINE 4 G/G
1 PATCH TOPICAL EVERY 24 HOURS
Qty: 10 PATCH | Refills: 0 | Status: SHIPPED | OUTPATIENT
Start: 2022-05-03 | End: 2024-09-15

## 2022-05-03 NOTE — ED PROVIDER NOTES
"ED Provider In Triage Note  [facility]  Encounter Date: May 3, 2022    Chief Complaint   Patient presents with     Shoulder Pain     Brief HPI:   Domenico Harper is a 52 year old male presenting to the Emergency Department with a chief complaint of left shoulder pain x 2 months.  Saw pcp.  Set up for therapy.  Patient reports too much pain for therapy.    Brief Physical Exam:  BP (!) 179/93   Pulse 89   Temp 98.9  F (37.2  C) (Temporal)   Resp 18   Ht 1.803 m (5' 11\")   Wt 81.6 kg (180 lb)   SpO2 100%   BMI 25.10 kg/m    General: Non-toxic appearing  HEENT: Atraumatic  Resp: No respiratory distress  Abdomen: Non-peritoneal  Neuro: Alert, oriented, answers questions appropriately  Psych: Behavior appropriate    Plan Initiated in Triage:  Orders Placed This Encounter     XR Shoulder Left 2 Views     PIT Dispo:   Return to lobby while awaiting workup and ED bed availability    Kemal eDnt MD on 5/3/2022 at 4:05 PM    Patient was evaluated by the Physician in Triage due to a limitation of available rooms in the Emergency Department. A plan of care was discussed based on the information obtained on the initial evaluation and patient was consuled to return back to the Emergency Department lobby after this initial evalutaiton until results were obtained or a room became available in the Emergency Department. Patient was counseled not to leave prior to receiving the results of their workup.     Kemal Dent MD  [facility] EMERGENCY DEPARTMENT  [address] [place] [id number]  [phone]     Kemal Dent MD  05/03/22 1610    "

## 2022-05-03 NOTE — ED PROVIDER NOTES
EMERGENCY DEPARTMENT ENCOUNTER      NAME: Domenico Harper  AGE: 52 year old male  YOB: 1970  MRN: [id number]  EVALUATION DATE & TIME: No admission date for patient encounter.    PCP: Gloria Rogers    ED PROVIDER: Romie Flores M.D.      Chief Complaint   Patient presents with     Shoulder Pain       FINAL IMPRESSION:  1. Left shoulder pain, unspecified chronicity    2. Osteoarthritis of left shoulder, unspecified osteoarthritis type        ED COURSE & MEDICAL DECISION MAKIN year old male presents to the Emergency Department for evaluation of left shoulder pain.  Symptoms are subacute with a couple months of left-sided shoulder pain which is very movement related.  He does have mildly decreased abduction at the left shoulder.  No palpable joint effusion, no erythema or warmth.  Previous history of DVT although he does not have any swelling, has no pleuritic chest pain, shortness of breath, normal vital signs with pain that seems strictly- musculoskeletal.  X-rays show some mild osteoarthritis and no other deformity.  He does have a history of repetitive strain at work and this may be contributing to some tendinopathy.  I placed a physical therapy referral for him.  We discussed scheduled NSAIDs and topical lidocaine.  He should continue discussing things with his primary care doctor.    6:04 PM I met with the patient to gather history and to perform my initial exam. We discussed plans for the ED course, including diagnostic testing and treatment. Also discussed results and plan for discharge.     At the conclusion of the encounter I discussed the results of all of the tests and the disposition. The questions were answered. The patient or family acknowledged understanding and was agreeable with the care plan.       MEDICATIONS GIVEN IN THE EMERGENCY:  Medications - No data to display    NEW PRESCRIPTIONS STARTED AT TODAY'S ER VISIT  Discharge Medication List as of 5/3/2022   6:19 PM      START taking these medications    Details   Lidocaine (LIDOCARE) 4 % Patch Place 1 patch onto the skin every 24 hours To prevent lidocaine toxicity, patient should be patch free for 12 hrs daily.Disp-10 patch, R-0Local Print      naproxen (NAPROSYN) 500 MG tablet Take 1 tablet (500 mg) by mouth 2 times daily (with meals) for 8 days, Disp-16 tablet, R-0, Local Print                =================================================================    HPI    Patient information was obtained from: patient     Use of : N/A       Domenico Harper is a 52 year old male with a pertinent history of DM2 who presents to this ED via walk-in for evaluation of left shoulder pain.     Patient reports ongoing left shoulder pain for the past few months. Pain is localized to his posterior upper arm and left shoulder. Worse with ROM of his left shoulder. Has not been taking any medications from his pain.Comes to the ED today because he had worsening pain that has been affecting his sleep. Patient reports the pain may be due to some heavy lifting from a few months ago as well as a previous job with repetitive motions from a few years ago. Denies fever, shortness of breath, cough. No other complaints or concerns expressed at this time.    REVIEW OF SYSTEMS   All systems reviewed and negative except as noted in HPI.    PAST MEDICAL HISTORY:  No past medical history on file.    PAST SURGICAL HISTORY:  No past surgical history on file.        CURRENT MEDICATIONS:    No current facility-administered medications for this encounter.     Current Outpatient Medications   Medication     Lidocaine (LIDOCARE) 4 % Patch     naproxen (NAPROSYN) 500 MG tablet     blood glucose test strips     blood-glucose meter Misc     generic lancets (FINGERSTIX LANCETS)     metFORMIN (GLUCOPHAGE XR) 500 MG 24 hr tablet     rivaroxaban ANTICOAGULANT (XARELTO) 20 mg tablet     XARELTO 15 mg tablet     XARELTO 20 mg tablet  "        ALLERGIES:  No Known Allergies    FAMILY HISTORY:  No family history on file.    SOCIAL HISTORY:   Social History     Socioeconomic History     Marital status:    Tobacco Use     Smoking status: Never Smoker     Smokeless tobacco: Never Used     Tobacco comment: no tobacco exposure   Substance and Sexual Activity     Sexual activity: Yes     Partners: Female     Birth control/protection: Condom       VITALS:  BP (!) 179/93   Pulse 89   Temp 98.9  F (37.2  C) (Temporal)   Resp 18   Ht 1.803 m (5' 11\")   Wt 81.6 kg (180 lb)   SpO2 100%   BMI 25.10 kg/m      PHYSICAL EXAM    Constitutional: Well developed, Well nourished, NAD.  HENT: Normocephalic, Atraumatic. Neck Supple.  Eyes: EOMI, Conjunctiva normal.  Respiratory: Breathing comfortably on room air. Speaks full sentences easily. Lungs clear to ascultation.  Cardiovascular: Normal heart rate, Regular rhythm. No peripheral edema.  Abdomen: Soft,nontender  Musculoskeletal: Mildly decreased passive and active range of motion specifically abduction and flexion of the left shoulder.  There is no asymmetric swelling erythema or warmth of the arm or shoulder.  Integument: Warm, Dry.  Neurologic: Alert & awake, Normal motor function, Normal sensory function, No focal deficits noted.   Psychiatric: Cooperative. Affect appropriate.       RADIOLOGY:  Reviewed all pertinent imaging. Please see official radiology report.  XR Shoulder Left 2 Views   Final Result   IMPRESSION: Anatomic alignment left shoulder. No acute displaced left   shoulder fracture. Mild left acromioclavicular joint osteoarthritis.   No advanced glenohumeral joint space narrowing. Visualized left lung   grossly clear. Flat acromion.      TRISHA GROSS MD            SYSTEM ID:  [id number]            Bi GAFFNEY, am serving as a scribe to document services personally performed by Dr. Romie Flores, based on my observation and the provider's statements to me. IRomie MD " attest that Bi Mirza is acting in a scribe capacity, has observed my performance of the services and has documented them in accordance with my direction.    Romie Flores M.D.  Emergency Medicine  [facility] EMERGENCY DEPARTMENT  [address] [place] [id number]  [phone]  Dept: [phone]     Romie Flores MD  05/03/22 2689

## 2022-05-03 NOTE — DISCHARGE INSTRUCTIONS
You were seen today in the emergency department for left shoulder pain.  Your evaluation today including an x-ray looked stable.  We did see some evidence of arthritis and we suspect you have a tendinitis/chronic  shoulder strain.  We agree that physical therapy would be a good next step in managing this discomfort.  In the meantime we would also like you to take anti-inflammatory medication like naproxen twice a day and you can apply topical lidocaine patches to the area of greatest discomfort.  Please avoid strenuous activity and heavy lifting as you rest and recover but continue gentle range of motion exercises.  We did place a physical therapy referral so keep your phone on and available so you can speak with a  about getting this set up.  Please continue working on any ongoing symptoms with your primary care doctor.

## 2022-05-03 NOTE — ED TRIAGE NOTES
Pt presents with left shoulder pain for the last 2 months. Pt was seen by PCP who recommended therapy but pt did not follow through because it hurt. Pt has not taken anything for the pain.      Triage Assessment     Row Name 05/03/22 4682       Triage Assessment (Adult)    Airway WDL WDL       Respiratory WDL    Respiratory WDL WDL       Skin Circulation/Temperature WDL    Skin Circulation/Temperature WDL WDL       Cardiac WDL    Cardiac WDL WDL       Peripheral/Neurovascular WDL    Peripheral Neurovascular WDL WDL       Cognitive/Neuro/Behavioral WDL    Cognitive/Neuro/Behavioral WDL WDL

## 2022-05-10 ENCOUNTER — HOSPITAL ENCOUNTER (OUTPATIENT)
Dept: PHYSICAL THERAPY | Facility: REHABILITATION | Age: 52
Discharge: HOME | End: 2022-05-10
Payer: COMMERCIAL

## 2022-05-10 DIAGNOSIS — M75.42 IMPINGEMENT SYNDROME OF SHOULDER REGION, LEFT: Primary | ICD-10-CM

## 2022-05-10 DIAGNOSIS — M25.512 LEFT SHOULDER PAIN, UNSPECIFIED CHRONICITY: ICD-10-CM

## 2022-05-10 DIAGNOSIS — M19.012 OSTEOARTHRITIS OF LEFT SHOULDER, UNSPECIFIED OSTEOARTHRITIS TYPE: ICD-10-CM

## 2022-05-10 PROCEDURE — 97161 PT EVAL LOW COMPLEX 20 MIN: CPT | Mod: GP

## 2022-05-10 PROCEDURE — 97110 THERAPEUTIC EXERCISES: CPT | Mod: GP

## 2022-05-10 PROCEDURE — 97535 SELF CARE MNGMENT TRAINING: CPT | Mod: GP

## 2022-05-10 NOTE — PROGRESS NOTES
[facility]    OUTPATIENT PHYSICAL THERAPY ORTHOPEDIC EVALUATION  PLAN OF TREATMENT FOR OUTPATIENT REHABILITATION  (COMPLETE FOR INITIAL CLAIMS ONLY)  Patient's Last Name, First Name, M.I.  YOB: 1970  Domenico Harper    Provider s Name:  [facility]   Medical Record No.  [id number]   Start of Care Date:  05/10/22   Onset Date:  03/16/22   Type:     _X__PT   ___OT   ___SLP Medical Diagnosis:  Left shoulder pain, unspecified chronicity. Osteoarthritis of left shoulder, unspecified osteoarthritis type     PT Diagnosis:  Left shoulder pain with muscle power deficits   Visits from SOC:  1      _________________________________________________________________________________  Plan of Treatment/Functional Goals:  joint mobilization, manual therapy, neuromuscular re-education, ROM, strengthening, stretching     Cryotherapy, Electrical stimulation, Hot packs     Goals  Goal Identifier: SPADI  Goal Description: Domenico will demonstrate improved function as evidenced by an improved (decreased) SPADI score to less than __.  Target Date: 06/28/22    Goal Identifier: Sleeping  Goal Description: Domenico will be able to sleep through the night without waking due to increased symptoms on at least 5/7 nights a week.  Target Date: 06/28/22    Goal Identifier: Showering  Goal Description: Domenico will be able to consistently shower without increase in symptoms.  Target Date: 06/28/22                                                           Therapy Frequency:   (1-2 times/week)  Predicted Duration of Therapy Intervention:  6 - 7 weeks (6 - 8 visits total)    Anatoly Mark, PT                 I CERTIFY THE NEED FOR THESE SERVICES FURNISHED UNDER        THIS PLAN OF TREATMENT AND WHILE UNDER MY CARE     (Physician co-signature of this document indicates review and certification of the therapy  plan).                     Certification Date From:  05/10/22   Certification Date To:  07/09/22    Referring Provider:  Dr. Romie Flores MD    Initial Assessment        See Epic Evaluation Start of Care Date: 05/10/22                                                                                   05/10/22 1200   General Information   Type of Visit Initial OP Ortho PT Evaluation   Start of Care Date 05/10/22   Referring Physician Dr. Romie Flores MD   Patient/Family Goals Statement Decrease pain   Orders Evaluate and Treat   Date of Order 05/03/22   Certification Required? Yes   Medical Diagnosis Left shoulder pain, unspecified chronicity. Osteoarthritis of left shoulder, unspecified osteoarthritis type   Surgical/Medical history reviewed Yes   Presentation and Etiology   Pertinent history of current problem (include personal factors and/or comorbidities that impact the POC) Domenico reports to physical therapy with complaints of pain in his lateral arm and posterior shoulder that started a few months ago. Several years ago, he worked as a  and did repetitive overhead motions with his left arm. He said he had the posterior shoulder pain then, but not the arm pain. When he quit, the pain mostly resolved. He reports no apparent mechanism of injury several months ago when the symptoms began. He complains of the most pain during sleep, and he wakes up several times a night due to pain. He usually sleeps on his left side, but he has started sleeping in other positions to try and relieve the pain. He denies any numbness or tingling.   Impairments A. Pain;D. Decreased ROM;E. Decreased flexibility;F. Decreased strength and endurance   Functional Limitations perform activities of daily living;perform required work activities   Symptom Location Left lateral upper arm and posterior shoulder   How/Where did it occur From insidious onset   Onset date of current episode/exacerbation 03/16/22   Chronicity New    Pain rating (0-10 point scale) Best (/10);Worst (/10);Other   Pain rating comment Current: 7/10   Pain quality A. Sharp;C. Aching   Frequency of pain/symptoms C. With activity   Pain/symptoms are: Worse during the night   Pain/symptoms exacerbated by G. Certain positions;H. Overhead reach;K. Home tasks;J. ADL;C. Lifting;D. Carrying   Pain/symptoms eased by K. Other;C. Rest   Pain eased by comment topical   Progression of symptoms since onset: Unchanged   Current / Previous Interventions   Diagnostic Tests: X-ray   X-ray Results Results   X-ray results IMPRESSION: Anatomic alignment left shoulder. No acute displaced left  shoulder fracture. Mild left acromioclavicular joint osteoarthritis. No advanced glenohumeral joint space narrowing. Visualized left lung  grossly clear. Flat acromion.   Fall Risk Screen   Fall screen completed by PT   Have you fallen 2 or more times in the past year? No   Have you fallen and had an injury in the past year? No   Is patient a fall risk? No   Abuse Screen (yes response referral indicated)   Feels Unsafe at Home or Work/School no   Feels Threatened by Someone no   Does Anyone Try to Keep You From Having Contact with Others or Doing Things Outside Your Home? no   Physical Signs of Abuse Present no   Patient needs abuse support services and resources No   Shoulder Objective Findings   Posture Forward head and slightly rounded shoulders   Cervical Screen (ROM, quadrant) All AROM WNL and pain-free   Thoracic Mobility Screen L rotation AROM: 50% to 75% (painful). R rotation AROM: 75% to 99%.   Shoulder Flexibility Comments Upper trap limited B   Neer's Test Positive   Lindsay-Evelio Test Positive   Shoulder Special Tests Comments Quincy's and empty can tests: positive   Palpation Tender along posterolateral upper arm and at lateral periscapular musculature. Muscle tension noted in thoracic and periscapular musculature.   Left Shoulder Flexion AROM WNL (painful)   Left Shoulder Abduction  AROM WNL (painful)   Left Shoulder ER AROM Slightly limited compared to R (painful)   Left Shoulder IR AROM Slightly limited compared to R (painful)   Left Shoulder Flexion Strength 4/5   Left Shoulder Abduction Strength 4/5   Left Shoulder ER Strength 4/5   Left Shoulder IR Strength 5/5   Planned Therapy Interventions   Planned Therapy Interventions joint mobilization;manual therapy;neuromuscular re-education;ROM;strengthening;stretching   Planned Modality Interventions   Planned Modality Interventions Cryotherapy;Electrical stimulation;Hot packs   Clinical Impression   Criteria for Skilled Therapeutic Interventions Met yes, treatment indicated   PT Diagnosis Left shoulder pain with muscle power deficits   Influenced by the following impairments Pain, poor posture, decreased ROM and flexibility, decreased strength, and muscle tension   Functional limitations due to impairments Lift, carry, throw, reach behind and overhead, sleep, drive, and other work and daily activities   Clinical Presentation Stable/Uncomplicated   Clinical Decision Making (Complexity) Low complexity   Therapy Frequency   (1-2 times/week)   Predicted Duration of Therapy Intervention (days/wks) 6 - 7 weeks (6 - 8 visits total)   Risk & Benefits of therapy have been explained Yes   Patient, Family & other staff in agreement with plan of care Yes   Clinical Impression Comments Domenico is a 52-year-old male who presents to physical therapy with signs and symptoms consistent with sub-acromial pain syndrome, including pain, poor posture, decreased flexibility and ROM, decreased strength, and muscle tension. These impairments limit his ability to lift, carry, thow, reach behind and overhead, sleep, drive, and perform other daily and work activities without pain and limitation. He will benefit from skilled therapy to address the listed impairments and limitations.   Ortho Goal 1   Goal Identifier SPADI   Goal Description Domenico will demonstrate improved  function as evidenced by an improved (decreased) SPADI score to less than __.   Goal Progress Will have patient complete form at next visit.   Target Date 06/28/22   Ortho Goal 2   Goal Identifier Sleeping   Goal Description Domenico will be able to sleep through the night without waking due to increased symptoms on at least 5/7 nights a week.   Goal Progress Wakes 3-4+ times/night   Target Date 06/28/22   Ortho Goal 3   Goal Identifier Showering   Goal Description Domenico will be able to consistently shower without increase in symptoms.   Goal Progress Unable   Target Date 06/28/22   Total Evaluation Time   PT Eval, Low Complexity Minutes (56700) 20   Therapy Certification   Certification date from 05/10/22   Certification date to 07/09/22   Medical Diagnosis Left shoulder pain, unspecified chronicity. Osteoarthritis of left shoulder, unspecified osteoarthritis type

## 2022-05-13 ENCOUNTER — HOSPITAL ENCOUNTER (OUTPATIENT)
Dept: PHYSICAL THERAPY | Facility: REHABILITATION | Age: 52
Discharge: HOME | End: 2022-05-13
Payer: COMMERCIAL

## 2022-05-13 DIAGNOSIS — M75.42 IMPINGEMENT SYNDROME OF SHOULDER REGION, LEFT: ICD-10-CM

## 2022-05-13 DIAGNOSIS — M25.512 LEFT SHOULDER PAIN, UNSPECIFIED CHRONICITY: Primary | ICD-10-CM

## 2022-05-13 DIAGNOSIS — M19.012 OSTEOARTHRITIS OF LEFT SHOULDER, UNSPECIFIED OSTEOARTHRITIS TYPE: ICD-10-CM

## 2022-05-13 PROCEDURE — 97110 THERAPEUTIC EXERCISES: CPT | Mod: GP

## 2022-06-01 ENCOUNTER — HOSPITAL ENCOUNTER (OUTPATIENT)
Dept: PHYSICAL THERAPY | Facility: REHABILITATION | Age: 52
Discharge: HOME | End: 2022-06-01
Payer: COMMERCIAL

## 2022-06-01 DIAGNOSIS — M19.012 OSTEOARTHRITIS OF LEFT SHOULDER, UNSPECIFIED OSTEOARTHRITIS TYPE: ICD-10-CM

## 2022-06-01 DIAGNOSIS — M25.512 LEFT SHOULDER PAIN, UNSPECIFIED CHRONICITY: Primary | ICD-10-CM

## 2022-06-01 DIAGNOSIS — M75.42 IMPINGEMENT SYNDROME OF SHOULDER REGION, LEFT: ICD-10-CM

## 2022-06-01 PROCEDURE — 97110 THERAPEUTIC EXERCISES: CPT | Mod: GP | Performed by: PHYSICAL THERAPIST

## 2022-06-01 PROCEDURE — 97140 MANUAL THERAPY 1/> REGIONS: CPT | Mod: GP | Performed by: PHYSICAL THERAPIST

## 2022-06-06 ENCOUNTER — TELEPHONE (OUTPATIENT)
Dept: PHYSICAL THERAPY | Facility: REHABILITATION | Age: 52
End: 2022-06-06
Payer: COMMERCIAL

## 2022-06-06 NOTE — TELEPHONE ENCOUNTER
I called Domenico regarding his missed appointment today, and he said that he had the time wrong.    He said that his shoulder continues to feel better.    He does not have anymore appointments scheduled, so I gave him the number to call to get more set up.

## 2022-07-17 ENCOUNTER — HEALTH MAINTENANCE LETTER (OUTPATIENT)
Age: 52
End: 2022-07-17

## 2022-08-23 NOTE — ADDENDUM NOTE
Encounter addended by: Rema Walton, PT on: 8/23/2022 2:49 PM   Actions taken: Clinical Note Signed, Episode resolved

## 2022-08-23 NOTE — PROGRESS NOTES
Outpatient Physical Therapy Discharge Note     Patient: Domenico Harper  : 1970    Beginning/End Dates of Reporting Period:  5/10/22 to 22    Referring Provider: Dr. Rogers    Therapy Diagnosis: L shoulder pain      Client Self Report: Domenico said that his L shoulder on his L side is feeling good most of the time.  He will still have some pain with sleeping or certain reaching movements.  He is pain free at rest.  He does his HEP exercises daily.    Objective Measurements:  See note below for most recent objective information.     Goals: Progressed   Goal Identifier SPADI   Goal Description Domenico will demonstrate improved function as evidenced by an improved (decreased) SPADI score to less than 25%.   Target Date 22   Date Met      Progress (detail required for progress note): 65.38%     Goal Identifier Sleeping   Goal Description Domenico will be able to sleep through the night without waking due to increased symptoms on at least 5/7 nights a week.   Target Date 22   Date Met      Progress (detail required for progress note): Improving     Goal Identifier Showering   Goal Description Domenico will be able to consistently shower without increase in symptoms.   Target Date 22   Date Met      Progress (detail required for progress note): Improving     Plan:  Discharge from therapy.    Reason for Discharge: Patient has not made expected progress due to interrupted treatment attendance.  Patient has failed to schedule further appointments.    Discharge Plan: Patient to continue home program.      Rema Walton, PT

## 2022-09-24 ENCOUNTER — HEALTH MAINTENANCE LETTER (OUTPATIENT)
Age: 52
End: 2022-09-24

## 2023-01-30 ENCOUNTER — HEALTH MAINTENANCE LETTER (OUTPATIENT)
Age: 53
End: 2023-01-30

## 2023-05-08 ENCOUNTER — HEALTH MAINTENANCE LETTER (OUTPATIENT)
Age: 53
End: 2023-05-08

## 2023-10-14 ENCOUNTER — HEALTH MAINTENANCE LETTER (OUTPATIENT)
Age: 53
End: 2023-10-14

## 2023-11-28 ENCOUNTER — OFFICE VISIT (OUTPATIENT)
Dept: FAMILY MEDICINE | Facility: CLINIC | Age: 53
End: 2023-11-28
Payer: COMMERCIAL

## 2023-11-28 VITALS
TEMPERATURE: 98.9 F | DIASTOLIC BLOOD PRESSURE: 95 MMHG | RESPIRATION RATE: 18 BRPM | SYSTOLIC BLOOD PRESSURE: 171 MMHG | HEART RATE: 99 BPM | OXYGEN SATURATION: 97 % | BODY MASS INDEX: 25.7 KG/M2 | WEIGHT: 184.3 LBS

## 2023-11-28 DIAGNOSIS — K21.9 GASTROESOPHAGEAL REFLUX DISEASE WITHOUT ESOPHAGITIS: Primary | ICD-10-CM

## 2023-11-28 PROCEDURE — 99214 OFFICE O/P EST MOD 30 MIN: CPT | Performed by: PHYSICIAN ASSISTANT

## 2023-11-28 RX ORDER — ONDANSETRON 4 MG/1
4 TABLET, ORALLY DISINTEGRATING ORAL EVERY 8 HOURS PRN
Qty: 9 TABLET | Refills: 0 | Status: SHIPPED | OUTPATIENT
Start: 2023-11-28 | End: 2023-12-01

## 2023-11-28 RX ORDER — FAMOTIDINE 20 MG/1
20 TABLET, FILM COATED ORAL 2 TIMES DAILY
Qty: 28 TABLET | Refills: 0 | Status: SHIPPED | OUTPATIENT
Start: 2023-11-28 | End: 2023-12-12

## 2023-11-28 NOTE — PROGRESS NOTES
URGENT CARE VISIT:    SUBJECTIVE:   Domenico Harper is a 53 year old male who presents with abdominal pain for 1 day. Abdominal pain is located over Epigastric and is described as burning. Pain timing/severity is described as gradual onset, waxes and wanes, and mild and moderate.  Pain is improved by nothing and worsened by eating. Associated symptoms include nausea and vomiting. He denies diarrhea, constipation, bloating, dysuria, frequency, fever, and chills. He has tried none with no relief of symptoms.  Appetite is decreased. Risk factors include sick contacts. Abdominal surgical history includes none.     PMH: History reviewed. No pertinent past medical history.  Allergies: Patient has no known allergies.  Medications:   Current Outpatient Medications   Medication Sig Dispense Refill    famotidine (PEPCID) 20 MG tablet Take 1 tablet (20 mg) by mouth 2 times daily for 14 days 28 tablet 0    ondansetron (ZOFRAN ODT) 4 MG ODT tab Take 1 tablet (4 mg) by mouth every 8 hours as needed for nausea 9 tablet 0    blood glucose test strips [BLOOD GLUCOSE TEST STRIPS] Use for home blood sugar monitoring two times a day.  Dispense brand per patient's insurance at pharmacy discretion. (Patient not taking: Reported on 11/28/2023) 100 strip 3    blood-glucose meter Misc [BLOOD-GLUCOSE METER MISC] Use for home blood sugar monitoring two times a day.  Dispense brand per patient's insurance at pharmacy discretion. (Patient not taking: Reported on 11/28/2023) 1 each 0    generic lancets (FINGERSTIX LANCETS) [GENERIC LANCETS (FINGERSTIX LANCETS)] Use for home blood sugar monitoring two times a day. Dispense brand per patient's insurance at pharmacy discretion. (Patient not taking: Reported on 11/28/2023) 100 each 3    Lidocaine (LIDOCARE) 4 % Patch Place 1 patch onto the skin every 24 hours To prevent lidocaine toxicity, patient should be patch free for 12 hrs daily. (Patient not taking: Reported on 11/28/2023) 10 patch 0     metFORMIN (GLUCOPHAGE XR) 500 MG 24 hr tablet [METFORMIN (GLUCOPHAGE XR) 500 MG 24 HR TABLET] Take 1 tablet (500 mg total) by mouth daily with breakfast. (Patient not taking: Reported on 11/28/2023) 30 tablet 3    XARELTO 15 mg tablet [XARELTO 15 MG TABLET] Take 1 tablet by mouth 2 (two) times a day. X 21 days (starting 11/11/2020) (Patient not taking: Reported on 11/28/2023)       Social History:   Social History     Socioeconomic History    Marital status:      Spouse name: Not on file    Number of children: Not on file    Years of education: Not on file    Highest education level: Not on file   Occupational History    Not on file   Tobacco Use    Smoking status: Never    Smokeless tobacco: Never    Tobacco comments:     no tobacco exposure   Substance and Sexual Activity    Alcohol use: Not on file    Drug use: Not on file    Sexual activity: Yes     Partners: Female     Birth control/protection: Condom   Other Topics Concern    Not on file   Social History Narrative    Not on file     Social Determinants of Health     Financial Resource Strain: Not on file   Food Insecurity: Not on file   Transportation Needs: Not on file   Physical Activity: Not on file   Stress: Not on file   Social Connections: Not on file   Interpersonal Safety: Not on file   Housing Stability: Not on file       ROS:   General: negative  Skin: negative  Eyes: negative  Ears/Nose/Throat: negative  Respiratory: No shortness of breath, dyspnea on exertion, cough, or hemoptysis  Cardiovascular: negative  Gastrointestinal: nausea, vomiting, and dyspepsia  Genitourinary: negative  Musculoskeletal: negative  Neurologic: negative       OBJECTIVE:  BP (!) 171/95   Pulse 99   Temp 98.9  F (37.2  C) (Oral)   Resp 18   Wt 83.6 kg (184 lb 4.8 oz)   SpO2 97%   BMI 25.70 kg/m    GENERAL APPEARANCE: healthy, alert and no distress  HEAD: atraumatic  EYES: EOMI,  PERRL, conjunctiva clear  RESP: lungs clear to auscultation - no rales, rhonchi or  wheezes  CV: regular rates and rhythm, normal S1 S2, no murmur noted  ABDOMEN: soft, normal bowel sounds, tenderness mild and moderate epigastric. Negative Houston sign.   SKIN: no suspicious lesions or rashes  NEURO: alert and oriented  PSYCH: normal mood and affect      ASSESSMENT:     ICD-10-CM    1. Gastroesophageal reflux disease without esophagitis  K21.9 ondansetron (ZOFRAN ODT) 4 MG ODT tab     famotidine (PEPCID) 20 MG tablet           PLAN:  Patient Instructions   Patient was educated on the natural course of condition.  Viral gastritis vs GERD. Take medications as prescribed. Side effects discussed. Acid reflux occurs when the acid un your stomach backs up into the esophagus. Most common symptoms include heartburn, stomach or chest discomfort, and nausea. Conservative measures discussed including eating food slowly, avoiding food 2 hours prior to bed, avoiding reflux triggering foods (spicy foods, coffee, or alcohol), raising the head of the bed, and over-the-counter acid reducers.  See your primary care provider if symptoms worsen or do not improve in 2 weeks. Seek emergency care if you develop severe abdominal pain, or fever. Patient verbalized understanding and is agreeable to plan. The patient was discharged ambulatory and in stable condition.   Patient verbalized understanding and is agreeable to plan. The patient was discharged ambulatory and in stable condition.    Shamika Beach PA-C ....................  11/28/2023   5:19 PM

## 2023-11-28 NOTE — PATIENT INSTRUCTIONS
Patient was educated on the natural course of condition.  Viral gastritis vs GERD. Take medications as prescribed. Side effects discussed. Acid reflux occurs when the acid un your stomach backs up into the esophagus. Most common symptoms include heartburn, stomach or chest discomfort, and nausea. Conservative measures discussed including eating food slowly, avoiding food 2 hours prior to bed, avoiding reflux triggering foods (spicy foods, coffee, or alcohol), raising the head of the bed, and over-the-counter acid reducers.  See your primary care provider if symptoms worsen or do not improve in 2 weeks. Seek emergency care if you develop severe abdominal pain, or fever. Patient verbalized understanding and is agreeable to plan. The patient was discharged ambulatory and in stable condition.

## 2024-03-02 ENCOUNTER — HEALTH MAINTENANCE LETTER (OUTPATIENT)
Age: 54
End: 2024-03-02

## 2024-07-14 ENCOUNTER — HEALTH MAINTENANCE LETTER (OUTPATIENT)
Age: 54
End: 2024-07-14

## 2024-07-30 ENCOUNTER — OFFICE VISIT (OUTPATIENT)
Dept: FAMILY MEDICINE | Facility: CLINIC | Age: 54
End: 2024-07-30

## 2024-07-30 VITALS
RESPIRATION RATE: 20 BRPM | HEART RATE: 78 BPM | TEMPERATURE: 98 F | SYSTOLIC BLOOD PRESSURE: 179 MMHG | OXYGEN SATURATION: 98 % | DIASTOLIC BLOOD PRESSURE: 89 MMHG

## 2024-07-30 DIAGNOSIS — R35.0 URINARY FREQUENCY: ICD-10-CM

## 2024-07-30 DIAGNOSIS — I10 PRIMARY HYPERTENSION: ICD-10-CM

## 2024-07-30 DIAGNOSIS — E11.65 TYPE 2 DIABETES MELLITUS WITH HYPERGLYCEMIA, WITHOUT LONG-TERM CURRENT USE OF INSULIN (H): Primary | ICD-10-CM

## 2024-07-30 LAB
ALBUMIN UR-MCNC: NEGATIVE MG/DL
ANION GAP SERPL CALCULATED.3IONS-SCNC: 10 MMOL/L (ref 7–15)
APPEARANCE UR: CLEAR
BILIRUB UR QL STRIP: NEGATIVE
BUN SERPL-MCNC: 13.6 MG/DL (ref 6–20)
CALCIUM SERPL-MCNC: 9.3 MG/DL (ref 8.8–10.4)
CHLORIDE SERPL-SCNC: 102 MMOL/L (ref 98–107)
COLOR UR AUTO: YELLOW
CREAT SERPL-MCNC: 1.37 MG/DL (ref 0.67–1.17)
EGFRCR SERPLBLD CKD-EPI 2021: 62 ML/MIN/1.73M2
GLUCOSE BLD-MCNC: 353 MG/DL (ref 60–99)
GLUCOSE SERPL-MCNC: 364 MG/DL (ref 70–99)
GLUCOSE UR STRIP-MCNC: >=1000 MG/DL
HBA1C MFR BLD: 10 % (ref 0–5.6)
HCO3 SERPL-SCNC: 26 MMOL/L (ref 22–29)
HGB UR QL STRIP: NEGATIVE
KETONES UR STRIP-MCNC: NEGATIVE MG/DL
LEUKOCYTE ESTERASE UR QL STRIP: NEGATIVE
NITRATE UR QL: NEGATIVE
PH UR STRIP: 6 [PH] (ref 5–8)
POTASSIUM SERPL-SCNC: 4.5 MMOL/L (ref 3.4–5.3)
SODIUM SERPL-SCNC: 138 MMOL/L (ref 135–145)
SP GR UR STRIP: 1.02 (ref 1–1.03)
UROBILINOGEN UR STRIP-ACNC: 0.2 E.U./DL

## 2024-07-30 PROCEDURE — 36415 COLL VENOUS BLD VENIPUNCTURE: CPT

## 2024-07-30 PROCEDURE — 99214 OFFICE O/P EST MOD 30 MIN: CPT

## 2024-07-30 PROCEDURE — 83036 HEMOGLOBIN GLYCOSYLATED A1C: CPT

## 2024-07-30 PROCEDURE — 81003 URINALYSIS AUTO W/O SCOPE: CPT

## 2024-07-30 PROCEDURE — 80048 BASIC METABOLIC PNL TOTAL CA: CPT

## 2024-07-30 PROCEDURE — 82947 ASSAY GLUCOSE BLOOD QUANT: CPT

## 2024-07-30 RX ORDER — LISINOPRIL 10 MG/1
10 TABLET ORAL DAILY
Qty: 30 TABLET | Refills: 0 | Status: SHIPPED | OUTPATIENT
Start: 2024-07-30 | End: 2024-07-30

## 2024-07-30 RX ORDER — METFORMIN HCL 500 MG
TABLET, EXTENDED RELEASE 24 HR ORAL
Qty: 70 TABLET | Refills: 0 | Status: SHIPPED | OUTPATIENT
Start: 2024-07-30 | End: 2024-09-09

## 2024-07-30 RX ORDER — METFORMIN HCL 500 MG
TABLET, EXTENDED RELEASE 24 HR ORAL
Qty: 70 TABLET | Refills: 0 | Status: SHIPPED | OUTPATIENT
Start: 2024-07-30 | End: 2024-07-30

## 2024-07-30 RX ORDER — LISINOPRIL 10 MG/1
10 TABLET ORAL DAILY
Qty: 30 TABLET | Refills: 0 | Status: SHIPPED | OUTPATIENT
Start: 2024-07-30 | End: 2024-09-05

## 2024-07-30 NOTE — PROGRESS NOTES
Assessment & Plan     Type 2 diabetes mellitus with hyperglycemia, without long-term current use of insulin (H)  Previously diagnosed with A1c of 8.33 years ago but has not been on any medication.  Reporting urinary frequency with some mild polydipsia.  Glucose check today was 353, A1c of 10.  UA revealed glucosuria.  Will start metformin and recommend patient follow-up with primary care for continued evaluation.  A1c obtained today as well as BMP.  - Basic metabolic panel  (Ca, Cl, CO2, Creat, Gluc, K, Na, BUN); Future  - Hemoglobin A1c; Future  - Glucose, whole blood; Future  - Basic metabolic panel  (Ca, Cl, CO2, Creat, Gluc, K, Na, BUN)  - Hemoglobin A1c  - Glucose, whole blood  - metFORMIN (GLUCOPHAGE XR) 500 MG 24 hr tablet; Take 1 tablet (500 mg) by mouth daily (with dinner) for 7 days, THEN 2 tablets (1,000 mg) daily (with dinner) for 7 days, THEN 3 tablets (1,500 mg) daily (with dinner) for 7 days, THEN 4 tablets (2,000 mg) daily (with dinner) for 7 days.    Urinary frequency  Likely 2/2 to diabetes given glucosuria.   - UA Macroscopic with reflex to Microscopic and Culture; Future  - Glucose, whole blood; Future  - UA Macroscopic with reflex to Microscopic and Culture  - Glucose, whole blood    Primary hypertension  Elevated today upon multiple rechecks.  Given poor diabetes control as well will start on lisinopril and recommend follow-up with PCP.  BMP pending.  - lisinopril (ZESTRIL) 10 MG tablet; Take 1 tablet (10 mg) by mouth daily    Return in about 2 weeks (around 8/13/2024) for Follow up with PCP.    Subjective   Domenico is a 54 year old, presenting for the following health issues:  Urgent Care (Pt has been having headache feels tired / pt thinks is his BP and blood sugars /Pt has not see his primary X2 years )    HPI   Patient is presenting today for concern about elevated blood pressure and elevated blood glucose.  Has a history of diagnosed type 2 diabetes but has never taken medication for it  including the metformin that was previously ordered.  He endorses urinary frequency, lightheadedness, fatigue over the last several weeks that has been worsening.  Otherwise he endorses no other symptoms.    Review of Systems  Constitutional, HEENT, cardiovascular, pulmonary, gi and gu systems are negative, except as otherwise noted.      Objective    BP (!) 179/89   Pulse 78   Temp 98  F (36.7  C) (Tympanic)   Resp 20   SpO2 98%   There is no height or weight on file to calculate BMI.  Physical Exam   GENERAL: alert and no distress  NECK: no adenopathy, no asymmetry, masses, or scars  RESP: lungs clear to auscultation - no rales, rhonchi or wheezes  CV: regular rate and rhythm, normal S1 S2, no S3 or S4, no murmur, click or rub, no peripheral edema  ABDOMEN: soft, nontender, no hepatosplenomegaly, no masses and bowel sounds normal  MS: no gross musculoskeletal defects noted, no edema    No results found for this or any previous visit (from the past 24 hour(s)).        Signed Electronically by: TEJ CARDENAS IN FAM

## 2024-09-01 DIAGNOSIS — I10 PRIMARY HYPERTENSION: ICD-10-CM

## 2024-09-01 DIAGNOSIS — E11.65 TYPE 2 DIABETES MELLITUS WITH HYPERGLYCEMIA, WITHOUT LONG-TERM CURRENT USE OF INSULIN (H): ICD-10-CM

## 2024-09-03 DIAGNOSIS — E11.65 TYPE 2 DIABETES MELLITUS WITH HYPERGLYCEMIA, WITHOUT LONG-TERM CURRENT USE OF INSULIN (H): ICD-10-CM

## 2024-09-03 DIAGNOSIS — I10 PRIMARY HYPERTENSION: ICD-10-CM

## 2024-09-03 RX ORDER — METFORMIN HCL 500 MG
TABLET, EXTENDED RELEASE 24 HR ORAL
Qty: 70 TABLET | Refills: 0 | OUTPATIENT
Start: 2024-09-03 | End: 2024-09-30

## 2024-09-03 RX ORDER — LISINOPRIL 10 MG/1
10 TABLET ORAL DAILY
Qty: 30 TABLET | Refills: 0 | OUTPATIENT
Start: 2024-09-03

## 2024-09-05 RX ORDER — METFORMIN HCL 500 MG
2000 TABLET, EXTENDED RELEASE 24 HR ORAL
Qty: 360 TABLET | Refills: 0 | Status: SHIPPED | OUTPATIENT
Start: 2024-09-05

## 2024-09-05 NOTE — TELEPHONE ENCOUNTER
No pharmacy noted.  Please contact patient to clarify preferred pharmacy and route to [address] Medication Refill Pool.    Thank you!  SANKET ThibodeauxN, RN-[facility]

## 2024-09-05 NOTE — TELEPHONE ENCOUNTER
No pharmacy pended/noted.  Please clarify preferred pharmacy with patient, schedule appointment and route back to [facility] Medication Refill [place].    Patient's last visit was on 7/30/24 with Dr. Villagomez at [facility].      Thank you!  ADELE Doss BSN, RN-[facility]

## 2024-09-05 NOTE — TELEPHONE ENCOUNTER
Medication Question or Refill        What medication are you calling about (include dose and sig)?: metFORMIN (GLUCOPHAGE XR) 500 MG 24 hr tablet     Preferred Pharmacy:   [id number] - [place], [place] - [address] [place] [address] [address]  [address] [place] [address]  Phone: [phone] Fax: [phone]        Controlled Substance Agreement on file:   CSA -- Patient Level:    CSA: None found at the patient level.       Who prescribed the medication?: Dr. Villagomez    Do you need a refill? Yes    When did you use the medication last? N/a    Patient offered an appointment? No: patient has appt on  9/9. Needs medication since he is out    Do you have any questions or concerns?  No      Could we send this information to you in [facility] or would you prefer to receive a phone call?:   Patient would prefer a phone call   Okay to leave a detailed message?: Yes at Home number on file [phone] (home)

## 2024-09-06 RX ORDER — LISINOPRIL 10 MG/1
10 TABLET ORAL DAILY
Qty: 30 TABLET | Refills: 0 | Status: SHIPPED | OUTPATIENT
Start: 2024-09-06 | End: 2024-09-09

## 2024-09-09 ENCOUNTER — OFFICE VISIT (OUTPATIENT)
Dept: FAMILY MEDICINE | Facility: CLINIC | Age: 54
End: 2024-09-09

## 2024-09-09 ENCOUNTER — ORDERS ONLY (AUTO-RELEASED) (OUTPATIENT)
Dept: FAMILY MEDICINE | Facility: CLINIC | Age: 54
End: 2024-09-09

## 2024-09-09 VITALS
BODY MASS INDEX: 26.48 KG/M2 | HEIGHT: 70 IN | WEIGHT: 185 LBS | OXYGEN SATURATION: 100 % | RESPIRATION RATE: 20 BRPM | TEMPERATURE: 97.2 F | DIASTOLIC BLOOD PRESSURE: 87 MMHG | HEART RATE: 76 BPM | SYSTOLIC BLOOD PRESSURE: 154 MMHG

## 2024-09-09 DIAGNOSIS — Z12.11 SCREEN FOR COLON CANCER: ICD-10-CM

## 2024-09-09 DIAGNOSIS — I82.401 ACUTE DEEP VEIN THROMBOSIS (DVT) OF RIGHT LOWER EXTREMITY, UNSPECIFIED VEIN (H): ICD-10-CM

## 2024-09-09 DIAGNOSIS — I10 PRIMARY HYPERTENSION: ICD-10-CM

## 2024-09-09 DIAGNOSIS — E11.65 TYPE 2 DIABETES MELLITUS WITH HYPERGLYCEMIA, WITHOUT LONG-TERM CURRENT USE OF INSULIN (H): Primary | ICD-10-CM

## 2024-09-09 LAB — HBA1C MFR BLD: 9 % (ref 0–5.6)

## 2024-09-09 PROCEDURE — 80061 LIPID PANEL: CPT | Performed by: FAMILY MEDICINE

## 2024-09-09 PROCEDURE — 99214 OFFICE O/P EST MOD 30 MIN: CPT | Performed by: FAMILY MEDICINE

## 2024-09-09 PROCEDURE — 80053 COMPREHEN METABOLIC PANEL: CPT | Performed by: FAMILY MEDICINE

## 2024-09-09 PROCEDURE — 36415 COLL VENOUS BLD VENIPUNCTURE: CPT | Performed by: FAMILY MEDICINE

## 2024-09-09 PROCEDURE — 82570 ASSAY OF URINE CREATININE: CPT | Performed by: FAMILY MEDICINE

## 2024-09-09 PROCEDURE — 83036 HEMOGLOBIN GLYCOSYLATED A1C: CPT | Performed by: FAMILY MEDICINE

## 2024-09-09 PROCEDURE — G2211 COMPLEX E/M VISIT ADD ON: HCPCS | Performed by: FAMILY MEDICINE

## 2024-09-09 PROCEDURE — 82043 UR ALBUMIN QUANTITATIVE: CPT | Performed by: FAMILY MEDICINE

## 2024-09-09 RX ORDER — LISINOPRIL 20 MG/1
20 TABLET ORAL DAILY
Qty: 90 TABLET | Refills: 1 | Status: SHIPPED | OUTPATIENT
Start: 2024-09-09

## 2024-09-09 ASSESSMENT — PAIN SCALES - GENERAL: PAINLEVEL: NO PAIN (0)

## 2024-09-09 NOTE — PROGRESS NOTES
1. Type 2 diabetes mellitus with hyperglycemia, without long-term current use of insulin (H)  This is a 53 yo male with type II DM - no recent follow up - check labs, encourage eye exam - adjust meds based on labs   - Albumin Random Urine Quantitative with Creat Ratio; Future  - Adult Eye  Referral; Future  - Lipid panel reflex to direct LDL Non-fasting; Future  - Hemoglobin A1c; Future  - Lipid Profile (Chol, Trig, HDL, LDL calc); Future  - Comprehensive metabolic panel (BMP + Alb, Alk Phos, ALT, AST, Total. Bili, TP); Future  - Lipid panel reflex to direct LDL Non-fasting  - Hemoglobin A1c  - Comprehensive metabolic panel (BMP + Alb, Alk Phos, ALT, AST, Total. Bili, TP)  - Albumin Random Urine Quantitative with Creat Ratio    2. Primary hypertension  Patient has hypertension - blood pressure is elevated at this time (?compliance) will increase Lisinopril from 10 to 20 mg daily) - check labs -   - Comprehensive metabolic panel (BMP + Alb, Alk Phos, ALT, AST, Total. Bili, TP); Future  - lisinopril (ZESTRIL) 20 MG tablet; Take 1 tablet (20 mg) by mouth daily.  Dispense: 90 tablet; Refill: 1  - Comprehensive metabolic panel (BMP + Alb, Alk Phos, ALT, AST, Total. Bili, TP)    3. Screen for colon cancer  Due for colon cancer screening - declines colonoscopy - agrees to Cologuard   - COLOGUARD([employer]); Future    4. Acute deep vein thrombosis (DVT) of right lower extremity, unspecified vein (H)  History of DVT right lower extremity (provoked) - no longer on anticoagulation therapy - no recurrences     Declines all vaccines today.     The longitudinal plan of care for the diagnosis(es)/condition(s) as documented were addressed during this visit. Due to the added complexity in care, I will continue to support Domenico in the subsequent management and with ongoing continuity of care.        Elier Acuña is a 54 year old, presenting for the following health issues:  Diabetes and Hypertension (Not taking  "medication)        9/9/2024     2:34 PM   Additional Questions   Roomed by Elissa     Was taking medication - missed about 6 days -   Checks blood sugars:  doesn't check them     Blood pressure - checks at home     \"No shots today\"    Still driving -     History of Present Illness       Diabetes:   He presents for follow up of diabetes.  He is checking home blood glucose one time daily.   He checks blood glucose before and after meals.  Blood glucose is sometimes over 200 and never under 70.  When his blood glucose is low, the patient is asymptomatic for confusion, blurred vision, lethargy and reports not feeling dizzy, shaky, or weak.  He is concerned about other.    He is not experiencing numbness or burning in feet, excessive thirst, blurry vision, weight changes or redness, sores or blisters on feet. The patient has not had a diabetic eye exam in the last 12 months.          He eats 2-3 servings of fruits and vegetables daily.He consumes 1 sweetened beverage(s) daily.He exercises with enough effort to increase his heart rate 20 to 29 minutes per day.  He exercises with enough effort to increase his heart rate 3 or less days per week.   He is taking medications regularly.       Review of Systems   Constitutional:  Negative for chills and fever.   HENT:  Negative for ear pain and sore throat.    Eyes:  Negative for pain and visual disturbance.   Respiratory:  Negative for cough and shortness of breath.    Cardiovascular:  Negative for chest pain and palpitations.   Gastrointestinal:  Negative for abdominal pain and vomiting.   Genitourinary:  Negative for dysuria and hematuria.   Musculoskeletal:  Negative for arthralgias and back pain.   Skin:  Negative for color change and rash.   Neurological:  Negative for seizures and syncope.   All other systems reviewed and are negative.          Objective    BP (!) 154/87   Pulse 76   Temp 97.2  F (36.2  C) (Temporal)   Resp 20   Ht 1.785 m (5' 10.28\")   Wt 83.9 kg " (185 lb)   SpO2 100%   BMI 26.34 kg/m    Body mass index is 26.34 kg/m .  Physical Exam  Vitals reviewed.   Constitutional:       General: He is not in acute distress.     Appearance: Normal appearance.   HENT:      Head: Normocephalic.      Right Ear: Tympanic membrane, ear canal and external ear normal.      Left Ear: Tympanic membrane, ear canal and external ear normal.      Nose: Nose normal.      Mouth/Throat:      Mouth: Mucous membranes are moist.      Pharynx: No posterior oropharyngeal erythema.   Eyes:      Extraocular Movements: Extraocular movements intact.      Conjunctiva/sclera: Conjunctivae normal.      Pupils: Pupils are equal, round, and reactive to light.   Cardiovascular:      Rate and Rhythm: Normal rate and regular rhythm.      Pulses: Normal pulses.      Heart sounds: Normal heart sounds. No murmur heard.  Pulmonary:      Effort: Pulmonary effort is normal.      Breath sounds: Normal breath sounds.   Abdominal:      Palpations: Abdomen is soft. There is no mass.      Tenderness: There is no abdominal tenderness. There is no guarding or rebound.   Musculoskeletal:         General: No deformity. Normal range of motion.      Cervical back: Normal range of motion and neck supple.      Comments: Diabetic foot exam: normal DP and PT pulses, no trophic changes or ulcerative lesions, and normal sensory exam     Lymphadenopathy:      Cervical: No cervical adenopathy.   Skin:     General: Skin is warm and dry.   Neurological:      General: No focal deficit present.      Mental Status: He is alert and oriented to person, place, and time.   Psychiatric:         Mood and Affect: Mood normal.         Behavior: Behavior normal.            Results for orders placed or performed in visit on 09/09/24   Lipid panel reflex to direct LDL Non-fasting     Status: None   Result Value Ref Range    Cholesterol 157 <200 mg/dL    Triglycerides 72 <150 mg/dL    Direct Measure HDL 44 >=40 mg/dL    LDL Cholesterol  Calculated 99 <100 mg/dL    Non HDL Cholesterol 113 <130 mg/dL    Patient Fasting > 8hrs? Unknown     Narrative    Cholesterol  Desirable: < 200 mg/dL  Borderline High: 200 - 239 mg/dL  High: >= 240 mg/dL    Triglycerides  Normal: < 150 mg/dL  Borderline High: 150 - 199 mg/dL  High: 200-499 mg/dL  Very High: >= 500 mg/dL    Direct Measure HDL  Female: >= 50 mg/dL   Male: >= 40 mg/dL    LDL Cholesterol  Desirable: < 100 mg/dL  Above Desirable: 100 - 129 mg/dL   Borderline High: 130 - 159 mg/dL   High:  160 - 189 mg/dL   Very High: >= 190 mg/dL    Non HDL Cholesterol  Desirable: < 130 mg/dL  Above Desirable: 130 - 159 mg/dL  Borderline High: 160 - 189 mg/dL  High: 190 - 219 mg/dL  Very High: >= 220 mg/dL   Hemoglobin A1c     Status: Abnormal   Result Value Ref Range    Hemoglobin A1C 9.0 (H) 0.0 - 5.6 %    Narrative    Results consistent with previous, repeat testing unnecessary    Comprehensive metabolic panel (BMP + Alb, Alk Phos, ALT, AST, Total. Bili, TP)     Status: Abnormal   Result Value Ref Range    Sodium 140 135 - 145 mmol/L    Potassium 4.1 3.4 - 5.3 mmol/L    Carbon Dioxide (CO2) 27 22 - 29 mmol/L    Anion Gap 10 7 - 15 mmol/L    Urea Nitrogen 10.0 6.0 - 20.0 mg/dL    Creatinine 0.75 0.67 - 1.17 mg/dL    GFR Estimate >90 >60 mL/min/1.73m2    Calcium 9.6 8.8 - 10.4 mg/dL    Chloride 103 98 - 107 mmol/L    Glucose 155 (H) 70 - 99 mg/dL    Alkaline Phosphatase 69 40 - 150 U/L    AST 20 0 - 45 U/L    ALT 24 0 - 70 U/L    Protein Total 7.4 6.4 - 8.3 g/dL    Albumin 4.6 3.5 - 5.2 g/dL    Bilirubin Total 0.6 <=1.2 mg/dL    Patient Fasting > 8hrs? Unknown    Albumin Random Urine Quantitative with Creat Ratio     Status: None   Result Value Ref Range    Creatinine Urine mg/dL 169.0 mg/dL    Albumin Urine mg/L 25.2 mg/L    Albumin Urine mg/g Cr 14.91 0.00 - 17.00 mg/g Cr       Prior to immunization administration, verified patients identity using patient s name and date of birth. Please see Immunization Activity for  additional information.     Screening Questionnaire for Adult Immunization    Are you sick today?   No   Do you have allergies to medications, food, a vaccine component or latex?   No   Have you ever had a serious reaction after receiving a vaccination?   No   Do you have a long-term health problem with heart, lung, kidney, or metabolic disease (e.g., diabetes), asthma, a blood disorder, no spleen, complement component deficiency, a cochlear implant, or a spinal fluid leak?  Are you on long-term aspirin therapy?   Don't Know   Do you have cancer, leukemia, HIV/AIDS, or any other immune system problem?   No   Do you have a parent, brother, or sister with an immune system problem?   No   In the past 3 months, have you taken medications that affect  your immune system, such as prednisone, other steroids, or anticancer drugs; drugs for the treatment of rheumatoid arthritis, Crohn s disease, or psoriasis; or have you had radiation treatments?   No   Have you had a seizure, or a brain or other nervous system problem?   No   During the past year, have you received a transfusion of blood or blood    products, or been given immune (gamma) globulin or antiviral drug?   No   For women: Are you pregnant or is there a chance you could become       pregnant during the next month?   No   Have you received any vaccinations in the past 4 weeks?   No     Immunization questionnaire was positive for at least one answer.  Notified .      Patient instructed to remain in clinic for 15 minutes afterwards, and to report any adverse reactions.     Screening performed by Elissa Major MA on 9/9/2024 at 2:40 PM.         Signed Electronically by: ANGELICA SQUIRES MD

## 2024-09-10 LAB
ALBUMIN SERPL BCG-MCNC: 4.6 G/DL (ref 3.5–5.2)
ALP SERPL-CCNC: 69 U/L (ref 40–150)
ALT SERPL W P-5'-P-CCNC: 24 U/L (ref 0–70)
ANION GAP SERPL CALCULATED.3IONS-SCNC: 10 MMOL/L (ref 7–15)
AST SERPL W P-5'-P-CCNC: 20 U/L (ref 0–45)
BILIRUB SERPL-MCNC: 0.6 MG/DL
BUN SERPL-MCNC: 10 MG/DL (ref 6–20)
CALCIUM SERPL-MCNC: 9.6 MG/DL (ref 8.8–10.4)
CHLORIDE SERPL-SCNC: 103 MMOL/L (ref 98–107)
CHOLEST SERPL-MCNC: 157 MG/DL
CREAT SERPL-MCNC: 0.75 MG/DL (ref 0.67–1.17)
CREAT UR-MCNC: 169 MG/DL
EGFRCR SERPLBLD CKD-EPI 2021: >90 ML/MIN/1.73M2
FASTING STATUS PATIENT QL REPORTED: ABNORMAL
FASTING STATUS PATIENT QL REPORTED: NORMAL
GLUCOSE SERPL-MCNC: 155 MG/DL (ref 70–99)
HCO3 SERPL-SCNC: 27 MMOL/L (ref 22–29)
HDLC SERPL-MCNC: 44 MG/DL
LDLC SERPL CALC-MCNC: 99 MG/DL
MICROALBUMIN UR-MCNC: 25.2 MG/L
MICROALBUMIN/CREAT UR: 14.91 MG/G CR (ref 0–17)
NONHDLC SERPL-MCNC: 113 MG/DL
POTASSIUM SERPL-SCNC: 4.1 MMOL/L (ref 3.4–5.3)
PROT SERPL-MCNC: 7.4 G/DL (ref 6.4–8.3)
SODIUM SERPL-SCNC: 140 MMOL/L (ref 135–145)
TRIGL SERPL-MCNC: 72 MG/DL

## 2024-09-15 DIAGNOSIS — E11.65 TYPE 2 DIABETES MELLITUS WITH HYPERGLYCEMIA, WITHOUT LONG-TERM CURRENT USE OF INSULIN (H): Primary | ICD-10-CM

## 2024-09-15 ASSESSMENT — ENCOUNTER SYMPTOMS
COLOR CHANGE: 0
EYE PAIN: 0
FEVER: 0
CHILLS: 0
HEMATURIA: 0
BACK PAIN: 0
SORE THROAT: 0
VOMITING: 0
ARTHRALGIAS: 0
SEIZURES: 0
DYSURIA: 0
ABDOMINAL PAIN: 0
COUGH: 0
PALPITATIONS: 0
SHORTNESS OF BREATH: 0

## 2024-10-21 ENCOUNTER — TELEPHONE (OUTPATIENT)
Dept: FAMILY MEDICINE | Facility: CLINIC | Age: 54
End: 2024-10-21

## 2024-10-21 DIAGNOSIS — E11.65 TYPE 2 DIABETES MELLITUS WITH HYPERGLYCEMIA, WITHOUT LONG-TERM CURRENT USE OF INSULIN (H): Primary | ICD-10-CM

## 2024-10-21 NOTE — TELEPHONE ENCOUNTER
New Medication Request    Contacts       Contact Date/Time Type Contact Phone/Fax    10/21/2024 10:11 AM CDT Phone (Incoming) NancyadamDomenico (Self) [phone] (H)            What medication are you requesting?: blood-glucose meter Misc     Reason for medication request: check blood sugar    Have you taken this medication before?: Yes: discontinued on 9/15/24    Controlled Substance Agreement on file:   CSA -- Patient Level:    CSA: None found at the patient level.         Patient offered an appointment? No    Preferred Pharmacy:   [id number] - [place], [place] - [address] [place] [address] [address]  [address] [place] [address]  Phone: [phone] Fax: [phone]        Could we send this information to you in [facility] or would you prefer to receive a phone call?:   Patient would prefer a phone call   Okay to leave a detailed message?: Yes at Home number on file [phone] (home)

## 2024-10-24 NOTE — TELEPHONE ENCOUNTER
Orders pended--please confirm desired testing frequency, sign if agree.    Christine Gardner RN  [facility]

## 2024-12-01 ENCOUNTER — HEALTH MAINTENANCE LETTER (OUTPATIENT)
Age: 54
End: 2024-12-01

## 2025-01-02 DIAGNOSIS — E11.65 TYPE 2 DIABETES MELLITUS WITH HYPERGLYCEMIA, WITHOUT LONG-TERM CURRENT USE OF INSULIN (H): ICD-10-CM

## 2025-01-06 RX ORDER — METFORMIN HYDROCHLORIDE 500 MG/1
2000 TABLET, EXTENDED RELEASE ORAL
Qty: 360 TABLET | Refills: 0 | Status: SHIPPED | OUTPATIENT
Start: 2025-01-06

## 2025-01-12 ENCOUNTER — HEALTH MAINTENANCE LETTER (OUTPATIENT)
Age: 55
End: 2025-01-12

## 2025-04-26 ENCOUNTER — HEALTH MAINTENANCE LETTER (OUTPATIENT)
Age: 55
End: 2025-04-26

## 2025-07-05 DIAGNOSIS — E11.65 TYPE 2 DIABETES MELLITUS WITH HYPERGLYCEMIA, WITHOUT LONG-TERM CURRENT USE OF INSULIN (H): ICD-10-CM

## 2025-07-06 RX ORDER — METFORMIN HYDROCHLORIDE 500 MG/1
2000 TABLET, EXTENDED RELEASE ORAL
Qty: 360 TABLET | Refills: 0 | Status: SHIPPED | OUTPATIENT
Start: 2025-07-06

## 2025-08-09 ENCOUNTER — HEALTH MAINTENANCE LETTER (OUTPATIENT)
Age: 55
End: 2025-08-09